# Patient Record
Sex: MALE | Race: WHITE | NOT HISPANIC OR LATINO | Employment: OTHER | URBAN - METROPOLITAN AREA
[De-identification: names, ages, dates, MRNs, and addresses within clinical notes are randomized per-mention and may not be internally consistent; named-entity substitution may affect disease eponyms.]

---

## 2019-01-26 ENCOUNTER — HOSPITAL ENCOUNTER (INPATIENT)
Facility: HOSPITAL | Age: 69
LOS: 3 days | Discharge: HOME | DRG: 813 | End: 2019-01-29
Attending: EMERGENCY MEDICINE | Admitting: INTERNAL MEDICINE
Payer: COMMERCIAL

## 2019-01-26 ENCOUNTER — APPOINTMENT (EMERGENCY)
Dept: CT IMAGING | Facility: HOSPITAL | Age: 69
DRG: 813 | End: 2019-01-26
Payer: COMMERCIAL

## 2019-01-26 ENCOUNTER — APPOINTMENT (EMERGENCY)
Dept: RADIOLOGY | Facility: HOSPITAL | Age: 69
DRG: 813 | End: 2019-01-26
Payer: COMMERCIAL

## 2019-01-26 DIAGNOSIS — Q98.4 KLINEFELTER SYNDROME: Chronic | ICD-10-CM

## 2019-01-26 DIAGNOSIS — Z86.69 HISTORY OF MIGRAINE HEADACHES: ICD-10-CM

## 2019-01-26 DIAGNOSIS — E03.9 HYPOTHYROIDISM: ICD-10-CM

## 2019-01-26 DIAGNOSIS — R07.9 CHEST PAIN: Primary | ICD-10-CM

## 2019-01-26 DIAGNOSIS — E34.9 TESTOSTERONE DEFICIENCY: ICD-10-CM

## 2019-01-26 DIAGNOSIS — D69.6 THROMBOCYTOPENIA (HCC): ICD-10-CM

## 2019-01-26 DIAGNOSIS — R73.9 HYPERGLYCEMIA: ICD-10-CM

## 2019-01-26 DIAGNOSIS — J06.9 URI (UPPER RESPIRATORY INFECTION): ICD-10-CM

## 2019-01-26 PROBLEM — D64.9 ANEMIA: Chronic | Status: ACTIVE | Noted: 2019-01-26

## 2019-01-26 PROBLEM — I10 HYPERTENSION: Chronic | Status: ACTIVE | Noted: 2019-01-26

## 2019-01-26 PROBLEM — E11.9 TYPE 2 DIABETES MELLITUS (HCC): Chronic | Status: ACTIVE | Noted: 2019-01-26

## 2019-01-26 PROBLEM — N17.9 ACUTE KIDNEY INJURY (HCC): Status: ACTIVE | Noted: 2019-01-26

## 2019-01-26 PROBLEM — E55.9 VITAMIN D DEFICIENCY: Chronic | Status: ACTIVE | Noted: 2019-01-26

## 2019-01-26 LAB
ABO GROUP BLD: NORMAL
ALBUMIN SERPL BCP-MCNC: 3.1 G/DL (ref 3.5–5)
ALP SERPL-CCNC: 126 U/L (ref 46–116)
ALT SERPL W P-5'-P-CCNC: 22 U/L (ref 12–78)
ANION GAP SERPL CALCULATED.3IONS-SCNC: 12 MMOL/L (ref 4–13)
APTT PPP: 28 SECONDS (ref 26–38)
AST SERPL W P-5'-P-CCNC: 21 U/L (ref 5–45)
BASOPHILS # BLD AUTO: 0.04 THOUSANDS/ΜL (ref 0–0.1)
BASOPHILS NFR BLD AUTO: 1 % (ref 0–1)
BILIRUB SERPL-MCNC: 0.5 MG/DL (ref 0.2–1)
BLD GP AB SCN SERPL QL: NEGATIVE
BUN SERPL-MCNC: 14 MG/DL (ref 5–25)
CALCIUM SERPL-MCNC: 9.1 MG/DL (ref 8.3–10.1)
CHLORIDE SERPL-SCNC: 103 MMOL/L (ref 100–108)
CO2 SERPL-SCNC: 23 MMOL/L (ref 21–32)
CREAT SERPL-MCNC: 1.32 MG/DL (ref 0.6–1.3)
EOSINOPHIL # BLD AUTO: 0.05 THOUSAND/ΜL (ref 0–0.61)
EOSINOPHIL NFR BLD AUTO: 1 % (ref 0–6)
ERYTHROCYTE [DISTWIDTH] IN BLOOD BY AUTOMATED COUNT: 16.4 % (ref 11.6–15.1)
GFR SERPL CREATININE-BSD FRML MDRD: 56 ML/MIN/1.73SQ M
GLUCOSE SERPL-MCNC: 120 MG/DL (ref 65–140)
GLUCOSE SERPL-MCNC: 131 MG/DL (ref 65–140)
GLUCOSE SERPL-MCNC: 176 MG/DL (ref 65–140)
HCT VFR BLD AUTO: 32.7 % (ref 36.5–49.3)
HGB BLD-MCNC: 10.3 G/DL (ref 12–17)
IMM GRANULOCYTES # BLD AUTO: 0.01 THOUSAND/UL (ref 0–0.2)
IMM GRANULOCYTES NFR BLD AUTO: 0 % (ref 0–2)
INR PPP: 0.97 (ref 0.86–1.17)
LYMPHOCYTES # BLD AUTO: 1.01 THOUSANDS/ΜL (ref 0.6–4.47)
LYMPHOCYTES NFR BLD AUTO: 22 % (ref 14–44)
MAGNESIUM SERPL-MCNC: 1.9 MG/DL (ref 1.6–2.6)
MCH RBC QN AUTO: 28 PG (ref 26.8–34.3)
MCHC RBC AUTO-ENTMCNC: 31.5 G/DL (ref 31.4–37.4)
MCV RBC AUTO: 89 FL (ref 82–98)
MONOCYTES # BLD AUTO: 0.33 THOUSAND/ΜL (ref 0.17–1.22)
MONOCYTES NFR BLD AUTO: 7 % (ref 4–12)
NEUTROPHILS # BLD AUTO: 3.13 THOUSANDS/ΜL (ref 1.85–7.62)
NEUTS SEG NFR BLD AUTO: 69 % (ref 43–75)
NRBC BLD AUTO-RTO: 0 /100 WBCS
PLATELET # BLD AUTO: 4 THOUSANDS/UL (ref 149–390)
POTASSIUM SERPL-SCNC: 3.6 MMOL/L (ref 3.5–5.3)
PROT SERPL-MCNC: 7.1 G/DL (ref 6.4–8.2)
PROTHROMBIN TIME: 12.6 SECONDS (ref 11.8–14.2)
RBC # BLD AUTO: 3.68 MILLION/UL (ref 3.88–5.62)
RH BLD: POSITIVE
SODIUM SERPL-SCNC: 138 MMOL/L (ref 136–145)
SPECIMEN EXPIRATION DATE: NORMAL
TROPONIN I SERPL-MCNC: <0.02 NG/ML
WBC # BLD AUTO: 4.57 THOUSAND/UL (ref 4.31–10.16)

## 2019-01-26 PROCEDURE — 86900 BLOOD TYPING SEROLOGIC ABO: CPT | Performed by: EMERGENCY MEDICINE

## 2019-01-26 PROCEDURE — 96361 HYDRATE IV INFUSION ADD-ON: CPT

## 2019-01-26 PROCEDURE — 70450 CT HEAD/BRAIN W/O DYE: CPT

## 2019-01-26 PROCEDURE — 86901 BLOOD TYPING SEROLOGIC RH(D): CPT | Performed by: EMERGENCY MEDICINE

## 2019-01-26 PROCEDURE — 85730 THROMBOPLASTIN TIME PARTIAL: CPT | Performed by: EMERGENCY MEDICINE

## 2019-01-26 PROCEDURE — 36415 COLL VENOUS BLD VENIPUNCTURE: CPT | Performed by: EMERGENCY MEDICINE

## 2019-01-26 PROCEDURE — 82948 REAGENT STRIP/BLOOD GLUCOSE: CPT

## 2019-01-26 PROCEDURE — 36430 TRANSFUSION BLD/BLD COMPNT: CPT

## 2019-01-26 PROCEDURE — 71046 X-RAY EXAM CHEST 2 VIEWS: CPT

## 2019-01-26 PROCEDURE — 96360 HYDRATION IV INFUSION INIT: CPT

## 2019-01-26 PROCEDURE — 84484 ASSAY OF TROPONIN QUANT: CPT | Performed by: EMERGENCY MEDICINE

## 2019-01-26 PROCEDURE — 99285 EMERGENCY DEPT VISIT HI MDM: CPT

## 2019-01-26 PROCEDURE — 93005 ELECTROCARDIOGRAM TRACING: CPT

## 2019-01-26 PROCEDURE — 86850 RBC ANTIBODY SCREEN: CPT | Performed by: EMERGENCY MEDICINE

## 2019-01-26 PROCEDURE — 85610 PROTHROMBIN TIME: CPT | Performed by: EMERGENCY MEDICINE

## 2019-01-26 PROCEDURE — 85025 COMPLETE CBC W/AUTO DIFF WBC: CPT | Performed by: EMERGENCY MEDICINE

## 2019-01-26 PROCEDURE — 99223 1ST HOSP IP/OBS HIGH 75: CPT | Performed by: INTERNAL MEDICINE

## 2019-01-26 PROCEDURE — P9037 PLATE PHERES LEUKOREDU IRRAD: HCPCS

## 2019-01-26 PROCEDURE — 84484 ASSAY OF TROPONIN QUANT: CPT | Performed by: INTERNAL MEDICINE

## 2019-01-26 PROCEDURE — 83735 ASSAY OF MAGNESIUM: CPT | Performed by: EMERGENCY MEDICINE

## 2019-01-26 PROCEDURE — 80053 COMPREHEN METABOLIC PANEL: CPT | Performed by: EMERGENCY MEDICINE

## 2019-01-26 PROCEDURE — 30233R1 TRANSFUSION OF NONAUTOLOGOUS PLATELETS INTO PERIPHERAL VEIN, PERCUTANEOUS APPROACH: ICD-10-PCS | Performed by: EMERGENCY MEDICINE

## 2019-01-26 RX ORDER — ESCITALOPRAM OXALATE 10 MG/1
10 TABLET ORAL DAILY
COMMUNITY
Start: 2017-08-09

## 2019-01-26 RX ORDER — CHLORTHALIDONE 25 MG/1
25 TABLET ORAL DAILY
Status: DISCONTINUED | OUTPATIENT
Start: 2019-01-26 | End: 2019-01-26

## 2019-01-26 RX ORDER — NITROGLYCERIN 0.4 MG/1
0.4 TABLET SUBLINGUAL
Status: DISCONTINUED | OUTPATIENT
Start: 2019-01-26 | End: 2019-01-29 | Stop reason: HOSPADM

## 2019-01-26 RX ORDER — RISPERIDONE 0.25 MG/1
0.5 TABLET, FILM COATED ORAL 2 TIMES DAILY
Status: DISCONTINUED | OUTPATIENT
Start: 2019-01-26 | End: 2019-01-29 | Stop reason: HOSPADM

## 2019-01-26 RX ORDER — ESCITALOPRAM OXALATE 10 MG/1
10 TABLET ORAL DAILY
Status: DISCONTINUED | OUTPATIENT
Start: 2019-01-26 | End: 2019-01-29 | Stop reason: HOSPADM

## 2019-01-26 RX ORDER — DIPHENHYDRAMINE HCL 25 MG
25 TABLET ORAL EVERY 6 HOURS PRN
Status: DISCONTINUED | OUTPATIENT
Start: 2019-01-26 | End: 2019-01-29 | Stop reason: HOSPADM

## 2019-01-26 RX ORDER — ONDANSETRON 2 MG/ML
4 INJECTION INTRAMUSCULAR; INTRAVENOUS EVERY 6 HOURS PRN
Status: DISCONTINUED | OUTPATIENT
Start: 2019-01-26 | End: 2019-01-29 | Stop reason: HOSPADM

## 2019-01-26 RX ORDER — SODIUM CHLORIDE 9 MG/ML
125 INJECTION, SOLUTION INTRAVENOUS CONTINUOUS
Status: DISCONTINUED | OUTPATIENT
Start: 2019-01-26 | End: 2019-01-26

## 2019-01-26 RX ORDER — PREDNISONE 20 MG/1
100 TABLET ORAL DAILY
Status: DISCONTINUED | OUTPATIENT
Start: 2019-01-26 | End: 2019-01-29 | Stop reason: HOSPADM

## 2019-01-26 RX ORDER — AZELASTINE 1 MG/ML
1 SPRAY, METERED NASAL 2 TIMES DAILY PRN
COMMUNITY

## 2019-01-26 RX ORDER — ERGOCALCIFEROL 1.25 MG/1
50000 CAPSULE ORAL WEEKLY
Status: DISCONTINUED | OUTPATIENT
Start: 2019-01-26 | End: 2019-01-29 | Stop reason: HOSPADM

## 2019-01-26 RX ORDER — LOSARTAN POTASSIUM 50 MG/1
100 TABLET ORAL DAILY
Status: DISCONTINUED | OUTPATIENT
Start: 2019-01-26 | End: 2019-01-29 | Stop reason: HOSPADM

## 2019-01-26 RX ORDER — CHLORTHALIDONE 25 MG/1
25 TABLET ORAL DAILY
COMMUNITY
Start: 2017-09-27 | End: 2019-01-29 | Stop reason: HOSPADM

## 2019-01-26 RX ORDER — SODIUM CHLORIDE 9 MG/ML
50 INJECTION, SOLUTION INTRAVENOUS CONTINUOUS
Status: DISCONTINUED | OUTPATIENT
Start: 2019-01-26 | End: 2019-01-27

## 2019-01-26 RX ORDER — FERROUS SULFATE 325(65) MG
325 TABLET ORAL DAILY
Status: DISCONTINUED | OUTPATIENT
Start: 2019-01-26 | End: 2019-01-29 | Stop reason: HOSPADM

## 2019-01-26 RX ORDER — CETIRIZINE HYDROCHLORIDE 10 MG/1
10 TABLET ORAL DAILY
COMMUNITY
Start: 2018-05-21

## 2019-01-26 RX ORDER — LEVOTHYROXINE SODIUM 0.07 MG/1
75 TABLET ORAL DAILY
Status: ON HOLD | COMMUNITY
Start: 2019-01-25 | End: 2019-01-29

## 2019-01-26 RX ORDER — ATORVASTATIN CALCIUM 40 MG/1
40 TABLET, FILM COATED ORAL DAILY
COMMUNITY
Start: 2018-08-06 | End: 2019-08-06

## 2019-01-26 RX ORDER — LOSARTAN POTASSIUM 100 MG/1
100 TABLET ORAL DAILY
COMMUNITY
Start: 2018-01-24

## 2019-01-26 RX ORDER — AZELASTINE 1 MG/ML
1 SPRAY, METERED NASAL 2 TIMES DAILY PRN
Status: DISCONTINUED | OUTPATIENT
Start: 2019-01-26 | End: 2019-01-28

## 2019-01-26 RX ORDER — LORATADINE 10 MG/1
10 TABLET ORAL DAILY
Status: DISCONTINUED | OUTPATIENT
Start: 2019-01-26 | End: 2019-01-29 | Stop reason: HOSPADM

## 2019-01-26 RX ORDER — FERROUS SULFATE 325(65) MG
325 TABLET ORAL DAILY
COMMUNITY
Start: 2018-05-21

## 2019-01-26 RX ORDER — SENNOSIDES 8.6 MG
325 CAPSULE ORAL EVERY 6 HOURS
COMMUNITY

## 2019-01-26 RX ORDER — ATORVASTATIN CALCIUM 40 MG/1
40 TABLET, FILM COATED ORAL DAILY
Status: DISCONTINUED | OUTPATIENT
Start: 2019-01-26 | End: 2019-01-29 | Stop reason: HOSPADM

## 2019-01-26 RX ORDER — SENNOSIDES 8.6 MG
1 TABLET ORAL
Status: DISCONTINUED | OUTPATIENT
Start: 2019-01-26 | End: 2019-01-28

## 2019-01-26 RX ORDER — CALCIUM CARBONATE 200(500)MG
1000 TABLET,CHEWABLE ORAL DAILY PRN
Status: DISCONTINUED | OUTPATIENT
Start: 2019-01-26 | End: 2019-01-29 | Stop reason: HOSPADM

## 2019-01-26 RX ORDER — ERGOCALCIFEROL (VITAMIN D2) 1250 MCG
50000 CAPSULE ORAL WEEKLY
COMMUNITY
Start: 2019-01-25 | End: 2020-01-25

## 2019-01-26 RX ORDER — PANTOPRAZOLE SODIUM 40 MG/1
40 TABLET, DELAYED RELEASE ORAL
Status: DISCONTINUED | OUTPATIENT
Start: 2019-01-27 | End: 2019-01-29 | Stop reason: HOSPADM

## 2019-01-26 RX ORDER — RISPERIDONE 0.5 MG/1
0.5 TABLET, FILM COATED ORAL 2 TIMES DAILY
COMMUNITY
Start: 2018-05-02

## 2019-01-26 RX ORDER — ACETAMINOPHEN 325 MG/1
650 TABLET ORAL EVERY 6 HOURS PRN
Status: DISCONTINUED | OUTPATIENT
Start: 2019-01-26 | End: 2019-01-29 | Stop reason: HOSPADM

## 2019-01-26 RX ORDER — DIPHENHYDRAMINE HCL 25 MG
25 TABLET ORAL EVERY 6 HOURS PRN
Status: DISCONTINUED | OUTPATIENT
Start: 2019-01-26 | End: 2019-01-26

## 2019-01-26 RX ORDER — LEVOTHYROXINE SODIUM 0.07 MG/1
75 TABLET ORAL DAILY
Status: DISCONTINUED | OUTPATIENT
Start: 2019-01-27 | End: 2019-01-29

## 2019-01-26 RX ADMIN — FERROUS SULFATE TAB 325 MG (65 MG ELEMENTAL FE) 325 MG: 325 (65 FE) TAB at 15:28

## 2019-01-26 RX ADMIN — INSULIN LISPRO 1 UNITS: 100 INJECTION, SOLUTION INTRAVENOUS; SUBCUTANEOUS at 21:54

## 2019-01-26 RX ADMIN — Medication 1 TABLET: at 15:28

## 2019-01-26 RX ADMIN — SODIUM CHLORIDE 75 ML/HR: 0.9 INJECTION, SOLUTION INTRAVENOUS at 15:16

## 2019-01-26 RX ADMIN — Medication 60 G: at 15:21

## 2019-01-26 RX ADMIN — ESCITALOPRAM OXALATE 10 MG: 10 TABLET ORAL at 15:27

## 2019-01-26 RX ADMIN — STANDARDIZED SENNA CONCENTRATE 8.6 MG: 8.6 TABLET ORAL at 21:54

## 2019-01-26 RX ADMIN — INSULIN DETEMIR 10 UNITS: 100 INJECTION, SOLUTION SUBCUTANEOUS at 21:54

## 2019-01-26 RX ADMIN — ATORVASTATIN CALCIUM 40 MG: 40 TABLET, FILM COATED ORAL at 15:28

## 2019-01-26 RX ADMIN — LORATADINE 10 MG: 10 TABLET ORAL at 15:28

## 2019-01-26 RX ADMIN — LOSARTAN POTASSIUM 100 MG: 50 TABLET, FILM COATED ORAL at 15:27

## 2019-01-26 RX ADMIN — Medication 60 G: at 19:37

## 2019-01-26 RX ADMIN — INSULIN LISPRO 2 UNITS: 100 INJECTION, SOLUTION INTRAVENOUS; SUBCUTANEOUS at 16:15

## 2019-01-26 RX ADMIN — NITROGLYCERIN 1 INCH: 20 OINTMENT TOPICAL at 11:17

## 2019-01-26 RX ADMIN — ACETAMINOPHEN 650 MG: 325 TABLET ORAL at 16:14

## 2019-01-26 RX ADMIN — DIPHENHYDRAMINE HCL 25 MG: 25 TABLET ORAL at 21:54

## 2019-01-26 RX ADMIN — RISPERIDONE 0.5 MG: 0.25 TABLET ORAL at 16:14

## 2019-01-26 RX ADMIN — Medication 400 MG: at 15:28

## 2019-01-26 RX ADMIN — CALCIUM CARBONATE (ANTACID) CHEW TAB 500 MG 1000 MG: 500 CHEW TAB at 21:54

## 2019-01-26 RX ADMIN — SODIUM CHLORIDE 125 ML/HR: 0.9 INJECTION, SOLUTION INTRAVENOUS at 11:16

## 2019-01-26 RX ADMIN — PREDNISONE 100 MG: 20 TABLET ORAL at 13:14

## 2019-01-26 NOTE — ASSESSMENT & PLAN NOTE
No results found for: HGBA1C    No results for input(s): POCGLU in the last 72 hours  Blood Sugar Average: Last 72 hrs:    · Insulin sliding scale  · Basal bolus insulin  · Monitor blood sugars  · Check A1c level  · Adjust treatment accordingly  · Hold off oral hypoglycemic agents while patient is in the hospital (patient is on metformin)  · I expect patient's blood sugars to go up as patient will be on prednisone

## 2019-01-26 NOTE — ED NOTES
Spoke with pharmacy regarding prednisone, stated that they will not verify order until more information regarding dose/reason is obtained by doctor       Galina Swanson, TRINA  01/26/19 8517

## 2019-01-26 NOTE — ASSESSMENT & PLAN NOTE
· Due to this, patient has testosterone deficiency  · At [facility], at [facility], patient's testosterone, done a few days ago, was very low  · Presently, with patient being evaluated for possibility of acute coronary syndrome, and testosterone known to increase the risk for MI, we will hold off any test also injection at this point, until totally rule out of any acute coronary syndrome  Presently, 1st troponin is negative  From my discussion with him, if totally rule out from any myocardial infarction, we will start the testosterone injection tomorrow  · Patient requests to see an endocrinologist, as he will stay here in [facility], for longer duration prior to going back to [place]  [facility]

## 2019-01-26 NOTE — ASSESSMENT & PLAN NOTE
· According the patient, he was just diagnosed again with vitamin-D deficiency  He has this before  · Thus according the patient, he was just prescribed with vitamin D 50,000 per week  Thus will start this medication today  · Outpatient follow-up with primary care physician

## 2019-01-26 NOTE — H&P
H&P- Jane Springer 1950, 79 y o  male MRN: [id number]    Unit/Bed#: ED 14 Encounter: [id number]    Primary Care Provider: No primary care provider on file  Date and time admitted to hospital: 1/26/2019 10:27 AM        * Thrombocytopenia ([address] )   Assessment & Plan    · Patient has chronic ITP  · Presently, patient's platelets are 9239  · Had some minor bruising on his left hand, dorsal side, with minor bleeding that has stopped  · Hematology consult  · Case discussed with Dr Luis Ruiz, hematologist, and from our discussion, we will start the patient on prednisone 100 mg once a day; we will give IV IG 1 gram/kilogram daily for 2 days (this was also discussed with the pharmacist)  With some bleeding, although minor, at this point in time, he is okay with the platelet transfusion that was ordered in the emergency room, for now, however, he told me that with ITP, this will just likely be destroyed  He also told me that it is not common for ITP to bleed  · With lightheadedness and headache, emergency room physician ordered for a stat CT scan of the head to rule out any bleed  Will follow up the results  · Monitor  · Watch out for any signs and symptoms of bleeding  Klinefelter syndrome   Assessment & Plan    · Due to this, patient has testosterone deficiency  · At [facility], at [facility], patient's testosterone, done a few days ago, was very low  · Presently, with patient being evaluated for possibility of acute coronary syndrome, and testosterone known to increase the risk for MI, we will hold off any test also injection at this point, until totally rule out of any acute coronary syndrome  Presently, 1st troponin is negative  From my discussion with him, if totally rule out from any myocardial infarction, we will start the testosterone injection tomorrow    · Patient requests to see an endocrinologist, as he will stay here in [facility], for longer duration prior to going back to [place]  Acute kidney injury [facility])   Assessment & Plan    · Based on my review of patient's previous BMP in [place], patient's baseline serum creatinine is around 0 84 to 1 0   · Likely prerenal   · Monitor  · IV fluids  · Avoid nephrotoxins  · Avoid hypotension  · Hold off chlorthalidone in the meantime  · If patient's kidney function is worsening, consider holding off losartan also  · For further workup and management if this worsens significantly or if no improvement  Vitamin D deficiency   Assessment & Plan    · According the patient, he was just diagnosed again with vitamin-D deficiency  He has this before  · Thus according the patient, he was just prescribed with vitamin D 50,000 per week  Thus will start this medication today  · Outpatient follow-up with primary care physician  Hypertension   Assessment & Plan    · Continue blood pressure medications  [facility] mellitus [facility]   Assessment & Plan    No results found for: HGBA1C    No results for input(s): POCGLU in the last 72 hours  Blood Sugar Average: Last 72 hrs:    · Insulin sliding scale  · Basal bolus insulin  · Monitor blood sugars  · Check A1c level  · Adjust treatment accordingly  · Hold off oral hypoglycemic agents while patient is in the hospital (patient is on metformin)  · I expect patient's blood sugars to go up as patient will be on prednisone  Anemia   Assessment & Plan    · Chronic  · Mild  · Monitor  · For further workup management if this worsens significantly  Chest pain   Assessment & Plan    · Rule out acute coronary syndrome  · No aspirin as patient has severe thrombocytopenia  · Trend patient's troponins  · Nitroglycerin p r n     · Oxygen p r n  Shailesh Pearce · Hold off testosterone injection in the meantime until completely rule out of possibility of acute coronary syndrome             VTE Prophylaxis: Pharmacologic VTE Prophylaxis contraindicated due to Severe thrombocytopenia  / sequential compression device   Code Status:  Full code  POLST: POLST form is not discussed and not completed at this time  Anticipated Length of Stay:  Patient will be admitted on an Inpatient basis with an anticipated length of stay of  greater than 2 midnights  Justification for Hospital Stay:  Due to the above findings and plans  Total Time for Visit, including Counseling / Coordination of Care: 1 hour  Greater than 50% of this total time spent on direct patient counseling and coordination of care  Chief Complaint:   Chest pain  History of Present Illness:    Branden Antonio is a 79 y o  male who presents to the emergency room [facility] with complaints of chest pains  Patient has history of Klinefelter syndrome with testosterone deficiency and history of chronic ITP  Patient also has history of hypertension and diabetes mellitus  Patient is from [place], but is visiting here in [facility] as he attended the  of her aunt  He told me that he had an episode of chest pain, when he woke up this morning  He described it as if an elephant was sitting on his chest   A few days ago, he received anemia from his doctor at [facility] and he was told that his platelet count is low at 8, as well as his testosterone level and vitamin-D level  He was instructed to checked up by both a hematologist and an endocrinologist   Patient was also prescribed with vitamin-D  Patient also has occasional lightheadedness and headache  Patient had some minor bruising on the left hand, that had a mild bleeding that has stopped  Otherwise no other bleeding  Patient denies any blood in the stools or blood in the urine or any bleeding in other parts of the body  Patient feels generally weak and admits to occasional shortness of breath  Patient denies any other symptoms other the ones mentioned above      Review of Systems:    Review of Systems     Ten point review systems done and they were negative except for the ones I mentioned in my history of present illness  Patient denies any nausea, vomiting or any abdominal pains  Patient denies any problems urinating or moving his bowels  Patient denies any fever chills or any cough or colds  Past Medical and Surgical History:     Past Medical History:   Diagnosis Date    Diabetes mellitus ([address] )     Hypertension     Klinefelter syndrome with XY/XXY mosaicism        Past Surgical History:   Procedure Laterality Date    CHOLECYSTECTOMY      GASTRIC BYPASS      REDUCTION MAMMAPLASTY      THROMBECTOMY      TONSILECTOMY AND ADNOIDECTOMY         Meds/Allergies:    Prior to Admission medications    Medication Sig Start Date End Date Taking?  Authorizing Provider   acetaminophen (TYLENOL) 650 mg CR tablet Take 325 mg by mouth every 6 (six) hours   Yes Historical Provider, MD   atorvastatin (LIPITOR) 40 mg tablet Take 40 mg by mouth daily 8/6/18 8/6/19 Yes Historical Provider, MD   azelastine (ASTELIN) 0 1 % nasal spray 1 spray into each nostril 2 (two) times a day as needed   Yes Historical Provider, MD   cetirizine (ZyrTEC) 10 mg tablet Take 10 mg by mouth daily 5/21/18  Yes Historical Provider, MD   chlorthalidone 25 mg tablet Take 25 mg by mouth daily 9/27/17  Yes Historical Provider, MD   Cyanocobalamin 1000 MCG/ML KIT Inject 1,000 mcg into a muscle every 30 (thirty) days 1/23/19  Yes Historical Provider, MD   ergocalciferol (ERGOCALCIFEROL) 06336 units capsule Take 50,000 Units by mouth once a week 1/25/19 1/25/20 Yes Historical Provider, MD   ferrous sulfate 325 (65 Fe) mg tablet Take 325 mg by mouth daily 5/21/18  Yes Historical Provider, MD   levothyroxine 75 mcg tablet Take 75 mcg by mouth daily 1/25/19 1/25/20 Yes Historical Provider, MD   losartan (COZAAR) 100 MG tablet Take 100 mg by mouth daily 1/24/18  Yes Historical Provider, MD   metFORMIN (GLUCOPHAGE) 500 mg tablet Take 1,000 mg by mouth daily 1/24/18  Yes Historical Provider, MD risperiDONE (RisperDAL) 0 5 mg tablet Take 0 5 mg by mouth 2 (two) times a day 5/2/18  Yes Historical Provider, MD   TESTOSTERONE CYPIONATE IM Inject 300 mg into a muscle every 14 (fourteen) days 1/23/19  Yes Historical Provider, MD   escitalopram (LEXAPRO) 10 mg tablet Take 10 mg by mouth daily 8/9/17   Historical Provider, MD   magnesium oxide (MAGOX 400) 400 mg Take 400 mg by mouth daily    Historical Provider, MD   Multiple Vitamins-Minerals (MULTIVITAMIN ADULT PO) Take 1 tablet by mouth daily    Historical Provider, MD     Patient's medication list was reviewed  Allergies: Allergies   Allergen Reactions    Lactose     Vicodin [Hydrocodone-Acetaminophen] Hives       Social History:     Marital Status:      History   Alcohol Use    Yes     History   Smoking Status    Never Smoker   Smokeless Tobacco    Not on file     History   Drug Use No       Family History:    History reviewed  No pertinent family history  Physical Exam:     Vitals:   Blood Pressure: 144/71 (01/26/19 1316)  Pulse: 61 (01/26/19 1316)  Temperature: 98 1 °F (36 7 °C) (01/26/19 1316)  Temp Source: Oral (01/26/19 1316)  Respirations: 17 (01/26/19 1316)  Weight - Scale: 126 kg (277 lb 12 5 oz) (01/26/19 1047)  SpO2: 97 % (01/26/19 1316)    Physical Exam   Constitutional: He is oriented to person, place, and time  No distress  HENT:   Head: Normocephalic and atraumatic  Nose: Nose normal    Mouth/Throat: No oropharyngeal exudate  Dry oral mucosa, dry tongue  Eyes: Conjunctivae are normal  Right eye exhibits no discharge  Left eye exhibits no discharge  No scleral icterus  Neck: No JVD present  No tracheal deviation present  Cardiovascular: Normal rate, regular rhythm and normal heart sounds  Exam reveals no gallop and no friction rub  No murmur heard  Pulmonary/Chest: Effort normal and breath sounds normal  No stridor  No respiratory distress  He has no wheezes  He has no rales  Abdominal: Soft   Bowel sounds are normal  He exhibits no distension  There is no tenderness  There is no rebound and no guarding    (patient mentioned mild soreness when I palpated his belly, but no significant tenderness)  Musculoskeletal: He exhibits no edema, tenderness or deformity  Neurological: He is alert and oriented to person, place, and time  No cranial nerve deficit  Skin: Skin is warm  No rash noted  He is not diaphoretic  No erythema  No pallor  Psychiatric: He has a normal mood and affect  His behavior is normal  Thought content normal    Vitals reviewed  Additional Data:     Lab Results: I have personally reviewed pertinent reports  Results from last 7 days  Lab Units 01/26/19  1109   WBC Thousand/uL 4 57   HEMOGLOBIN g/dL 10 3*   HEMATOCRIT % 32 7*   PLATELETS Thousands/uL 4*   NEUTROS PCT % 69   LYMPHS PCT % 22   MONOS PCT % 7   EOS PCT % 1       Results from last 7 days  Lab Units 01/26/19  1109   SODIUM mmol/L 138   POTASSIUM mmol/L 3 6   CHLORIDE mmol/L 103   CO2 mmol/L 23   BUN mg/dL 14   CREATININE mg/dL 1 32*   ANION GAP mmol/L 12   CALCIUM mg/dL 9 1   ALBUMIN g/dL 3 1*   TOTAL BILIRUBIN mg/dL 0 50   ALK PHOS U/L 126*   ALT U/L 22   AST U/L 21   GLUCOSE RANDOM mg/dL 120       Results from last 7 days  Lab Units 01/26/19  1109   INR  0 97                   Imaging: I have personally reviewed pertinent reports  CT head without contrast   Final Result by Gardenia Sheehan MD (01/26 1238)      No acute intracranial abnormality  Workstation performed: [id number]         XR chest 2 views    (Results Pending)       EKG, Pathology, and Other Studies Reviewed on Admission:   · EKG:  Sinus bradycardia at 55 per minute, with minimal voltage criteria for LVH, maybe normal variant  No previous EKG for comparison  [facility] Yes     ** Please Note: This note has been constructed using a voice recognition system   **

## 2019-01-26 NOTE — ASSESSMENT & PLAN NOTE
· Based on my review of patient's previous BMP in [place], patient's baseline serum creatinine is around 0 84 to 1 0   · Likely prerenal   · Monitor  · IV fluids  · Avoid nephrotoxins  · Avoid hypotension  · Hold off chlorthalidone in the meantime  · If patient's kidney function is worsening, consider holding off losartan also  · For further workup and management if this worsens significantly or if no improvement

## 2019-01-26 NOTE — ASSESSMENT & PLAN NOTE
· Rule out acute coronary syndrome  · No aspirin as patient has severe thrombocytopenia  · Trend patient's troponins  · Nitroglycerin p r n     · Oxygen p r n  Suleiman Ingles · Hold off testosterone injection in the meantime until completely rule out of possibility of acute coronary syndrome

## 2019-01-26 NOTE — PLAN OF CARE
CARDIOVASCULAR - ADULT     Maintains optimal cardiac output and hemodynamic stability Progressing     Absence of cardiac dysrhythmias or at baseline rhythm Progressing        METABOLIC, FLUID AND ELECTROLYTES - ADULT     Electrolytes maintained within normal limits Progressing     Fluid balance maintained Progressing     Glucose maintained within target range Progressing        Potential for Falls     Patient will remain free of falls Progressing

## 2019-01-26 NOTE — PROGRESS NOTES
Pt complained about chest pain at 1530  Performed EKG and vitals sign  Trop are negative so far  Pt requested tylenol and risperdal medication  Gave to the pt  Called [facility]  No new orders given at this moment  Will continue to monitor pt

## 2019-01-26 NOTE — ED PROVIDER NOTES
History  Chief Complaint   Patient presents with    Chest Pain     pt reports CP, SOB, weakness for appox "24-48hours" Pt resides in [place] here visiting family  Pt recieved email from hematologist stating "my platelets are 8, she told me to get to a hospital right away" Pt reprots pain feels like "an elephant on my chest"  Pt further reprots recent passing of female figure and dog     16-year-old male presents to the emergency department for evaluation of heavy pressure like chest pain that has been present for the past 1 to 2 days  Patient states that the pain that started while he was at rest and has been fluctuating in intensity  Patient states he started to have acid reflux symptoms 2 days ago  He denies having a history of coronary artery disease  He recently traveled to the area from [place]  He is currently staying with his mother  He recently lost his dog and his wife  1 year ago  He plans to stay here he and would need to establish care with a hematologist   Patient has a history of thrombocytopenia, he was contacted by his hematologist on the way to [place] and was told that his platelets were 3619 and that he needed to be seen by hematologist as soon as possible  Patient does admit to occasional bruising  He has not had any spontaneous bleeding  He has been having occasional migraines last headache was 2 days ago  He denies recent fall or head injury  Patient states that his chest pain is associated with shortness of breath at times  He is currently without any respiratory symptoms          History provided by:  Patient and medical records   used: No    Chest Pain   Pain location:  Substernal area  Pain quality: pressure    Pain radiates to:  Does not radiate  Pain radiates to the back: no    Pain severity:  Severe  Onset quality:  Unable to specify  Duration:  2 days  Timing:  Intermittent  Progression:  Waxing and waning  Chronicity:  New  Context: at rest Relieved by:  Nothing  Worsened by:  Nothing tried  Ineffective treatments:  None tried  Associated symptoms: fatigue, headache, heartburn, lower extremity edema and shortness of breath    Associated symptoms: no abdominal pain, no anorexia, no anxiety, no back pain, no cough, no diaphoresis, no nausea, no palpitations, no PND, no syncope, not vomiting and no weakness    Risk factors: diabetes mellitus, hypertension and male sex        Prior to Admission Medications   Prescriptions Last Dose Informant Patient Reported? Taking?    Cyanocobalamin 1000 MCG/ML KIT   Yes Yes   Sig: Inject 1,000 mcg into a muscle every 30 (thirty) days   Multiple Vitamins-Minerals (MULTIVITAMIN ADULT PO)   Yes No   Sig: Take 1 tablet by mouth daily   TESTOSTERONE CYPIONATE IM   Yes Yes   Sig: Inject 300 mg into a muscle every 14 (fourteen) days   acetaminophen (TYLENOL) 650 mg CR tablet   Yes Yes   Sig: Take 325 mg by mouth every 6 (six) hours   atorvastatin (LIPITOR) 40 mg tablet   Yes Yes   Sig: Take 40 mg by mouth daily   azelastine (ASTELIN) 0 1 % nasal spray   Yes Yes   Si spray into each nostril 2 (two) times a day as needed   cetirizine (ZyrTEC) 10 mg tablet   Yes Yes   Sig: Take 10 mg by mouth daily   chlorthalidone 25 mg tablet   Yes Yes   Sig: Take 25 mg by mouth daily   ergocalciferol (ERGOCALCIFEROL) 52095 units capsule   Yes Yes   Sig: Take 50,000 Units by mouth once a week   escitalopram (LEXAPRO) 10 mg tablet Not Taking at Unknown time  Yes No   Sig: Take 10 mg by mouth daily   ferrous sulfate 325 (65 Fe) mg tablet   Yes Yes   Sig: Take 325 mg by mouth daily   levothyroxine 75 mcg tablet   Yes Yes   Sig: Take 75 mcg by mouth daily   losartan (COZAAR) 100 MG tablet   Yes Yes   Sig: Take 100 mg by mouth daily   magnesium oxide (MAGOX 400) 400 mg   Yes No   Sig: Take 400 mg by mouth daily   metFORMIN (GLUCOPHAGE) 500 mg tablet   Yes Yes   Sig: Take 1,000 mg by mouth daily   risperiDONE (RisperDAL) 0 5 mg tablet   Yes Yes Sig: Take 0 5 mg by mouth 2 (two) times a day      Facility-Administered Medications: None       Past Medical History:   Diagnosis Date    Diabetes mellitus ([address] )     Hypertension     Klinefelter syndrome with XY/XXY mosaicism        Past Surgical History:   Procedure Laterality Date    CHOLECYSTECTOMY      GASTRIC BYPASS      REDUCTION MAMMAPLASTY      THROMBECTOMY      TONSILECTOMY AND ADNOIDECTOMY         History reviewed  No pertinent family history  I have reviewed and agree with the history as documented  Social History   Substance Use Topics    Smoking status: Never Smoker    Smokeless tobacco: Never Used    Alcohol use Yes        Review of Systems   Constitutional: Positive for fatigue  Negative for diaphoresis and unexpected weight change  Respiratory: Positive for shortness of breath  Negative for cough  Cardiovascular: Positive for chest pain  Negative for palpitations, syncope and PND  Gastrointestinal: Positive for heartburn  Negative for abdominal pain, anorexia, nausea and vomiting  Musculoskeletal: Negative for back pain and neck pain  Skin: Negative for wound  Neurological: Positive for headaches  Negative for weakness  Hematological: Bruises/bleeds easily  All other systems reviewed and are negative  Physical Exam  Physical Exam   Constitutional: He is oriented to person, place, and time  Vital signs are normal  He appears well-developed and well-nourished  HENT:   Head: Normocephalic and atraumatic  Right Ear: External ear normal    Left Ear: External ear normal    Nose: Nose normal    Mouth/Throat: Oropharynx is clear and moist    Eyes: Pupils are equal, round, and reactive to light  Conjunctivae and EOM are normal    Neck: Normal range of motion  Neck supple  Cardiovascular: Normal rate, regular rhythm, normal heart sounds and intact distal pulses      No reproducible chest wall tenderness   Pulmonary/Chest: Effort normal and breath sounds normal  No accessory muscle usage  No respiratory distress  He exhibits no tenderness  Abdominal: Soft  Normal appearance and bowel sounds are normal  He exhibits no distension  There is no tenderness  There is no rebound and no guarding  Musculoskeletal: Normal range of motion  He exhibits no edema, tenderness or deformity  Lymphadenopathy:     He has no cervical adenopathy  Neurological: He is alert and oriented to person, place, and time  He has normal strength and normal reflexes  Coordination normal    Skin: Skin is warm, dry and intact  Capillary refill takes less than 2 seconds  No rash noted  No pallor  Psychiatric: He has a normal mood and affect  His behavior is normal  Judgment and thought content normal    Nursing note and vitals reviewed        Vital Signs  ED Triage Vitals [01/26/19 1034]   Temperature Pulse Respirations Blood Pressure SpO2   98 5 °F (36 9 °C) 65 20 145/76 100 %      Temp Source Heart Rate Source Patient Position - Orthostatic VS BP Location FiO2 (%)   Oral Monitor Lying Right arm --      Pain Score       Worst Possible Pain           Vitals:    01/29/19 1010 01/29/19 1020 01/29/19 1021 01/29/19 1025   BP: 129/61 113/69 110/71 115/72   Pulse:       Patient Position - Orthostatic VS: Lying - Orthostatic VS Sitting - Orthostatic VS Sitting - Orthostatic VS Standing for 3 minutes - Orthostatic VS       Visual Acuity  Visual Acuity      Most Recent Value   R Pupil Size (mm)  3          ED Medications  Medications   nitroglycerin (NITRO-BID) 2 % TD ointment 1 inch (1 inch Topical Given 1/26/19 1117)   immune globulin, human (GAMUNEX-C) 60 g 600 mL IV soln (0 g Intravenous Stopped 1/27/19 2051)     And   immune globulin, human (GAMUNEX-C) 60 g 600 mL IV soln (0 g Intravenous Stopped 1/27/19 2336)   bisacodyl (DULCOLAX) rectal suppository 10 mg (10 mg Rectal Given 1/29/19 1322)       Diagnostic Studies  Results Reviewed     Procedure Component Value Units Date/Time    Hemoglobin A1c w/EAG Estimation (Orders if not completed within the last 90 days) [[id number]] Collected:  01/27/19 0431    Lab Status:  Final result Specimen:  Blood from Arm, Left Updated:  01/27/19 1258     Hemoglobin A1C 5 9 %       mg/dl     Basic metabolic panel [[id number]]  (Abnormal) Collected:  01/27/19 0431    Lab Status:  Final result Specimen:  Blood from Hand, Left Updated:  01/27/19 0704     Sodium 136 mmol/L      Potassium 4 2 mmol/L      Chloride 105 mmol/L      CO2 20 (L) mmol/L      ANION GAP 11 mmol/L      BUN 15 mg/dL      Creatinine 1 12 mg/dL      Glucose 133 mg/dL      Calcium 9 0 mg/dL      eGFR 68 ml/min/1 73sq m     Narrative:         National Kidney Disease Education Program recommendations are as follows:  GFR calculation is accurate only with a steady state creatinine  Chronic Kidney disease less than 60 ml/min/1 73 sq  meters  Kidney failure less than 15 ml/min/1 73 sq  meters      CBC (With Platelets) [[id number]]  (Abnormal) Collected:  01/27/19 0431    Lab Status:  Final result Specimen:  Blood from Arm, Left Updated:  01/27/19 0702     WBC 3 30 (L) Thousand/uL      RBC 3 47 (L) Million/uL      Hemoglobin 9 6 (L) g/dL      Hematocrit 31 7 (L) %      MCV 91 fL      MCH 27 7 pg      MCHC 30 3 (L) g/dL      RDW 16 6 (H) %      Platelets 29 (LL) Thousands/uL      MPV 12 0 fL     Troponin I [[id number]]  (Normal) Collected:  01/26/19 1500    Lab Status:  Final result Specimen:  Blood from Arm, Left Updated:  01/26/19 1542     Troponin I <0 02 ng/mL     CBC and differential [[id number]]  (Abnormal) Collected:  01/26/19 1109    Lab Status:  Final result Specimen:  Blood from Arm, Right Updated:  01/26/19 1155     WBC 4 57 Thousand/uL      RBC 3 68 (L) Million/uL      Hemoglobin 10 3 (L) g/dL      Hematocrit 32 7 (L) %      MCV 89 fL      MCH 28 0 pg      MCHC 31 5 g/dL      RDW 16 4 (H) %      Platelets 4 (LL) Thousands/uL      nRBC 0 /100 WBCs      Neutrophils Relative 69 %      Immat GRANS % 0 % Lymphocytes Relative 22 %      Monocytes Relative 7 %      Eosinophils Relative 1 %      Basophils Relative 1 %      Neutrophils Absolute 3 13 Thousands/µL      Immature Grans Absolute 0 01 Thousand/uL      Lymphocytes Absolute 1 01 Thousands/µL      Monocytes Absolute 0 33 Thousand/µL      Eosinophils Absolute 0 05 Thousand/µL      Basophils Absolute 0 04 Thousands/µL     Comprehensive metabolic panel [[id number]]  (Abnormal) Collected:  01/26/19 1109    Lab Status:  Final result Specimen:  Blood from Arm, Right Updated:  01/26/19 1141     Sodium 138 mmol/L      Potassium 3 6 mmol/L      Chloride 103 mmol/L      CO2 23 mmol/L      ANION GAP 12 mmol/L      BUN 14 mg/dL      Creatinine 1 32 (H) mg/dL      Glucose 120 mg/dL      Calcium 9 1 mg/dL      AST 21 U/L      ALT 22 U/L      Alkaline Phosphatase 126 (H) U/L      Total Protein 7 1 g/dL      Albumin 3 1 (L) g/dL      Total Bilirubin 0 50 mg/dL      eGFR 56 ml/min/1 73sq m     Narrative:         National Kidney Disease Education Program recommendations are as follows:  GFR calculation is accurate only with a steady state creatinine  Chronic Kidney disease less than 60 ml/min/1 73 sq  meters  Kidney failure less than 15 ml/min/1 73 sq  meters      Magnesium [[id number]]  (Normal) Collected:  01/26/19 1109    Lab Status:  Final result Specimen:  Blood from Arm, Right Updated:  01/26/19 1141     Magnesium 1 9 mg/dL     Troponin I [[id number]]  (Normal) Collected:  01/26/19 1109    Lab Status:  Final result Specimen:  Blood from Arm, Right Updated:  01/26/19 1138     Troponin I <0 02 ng/mL     Protime-INR [[id number]]  (Normal) Collected:  01/26/19 1109    Lab Status:  Final result Specimen:  Blood from Arm, Right Updated:  01/26/19 1128     Protime 12 6 seconds      INR 0 97    APTT [[id number]]  (Normal) Collected:  01/26/19 1109    Lab Status:  Final result Specimen:  Blood from Arm, Right Updated:  01/26/19 1128     PTT 28 seconds                  XR chest 2 views Final Result by Sierra Morillo MD (01/27 2285)      No acute cardiopulmonary disease  Workstation performed: [id number]         CT head without contrast   Final Result by Zoey Bland MD (01/26 1238)      No acute intracranial abnormality                    Workstation performed: [id number]                    Procedures  ECG 12 Lead Documentation  Date/Time: 1/26/2019 12:24 PM  Performed by: Agustin Mahmood  Authorized by: LUCI LYNCH     Indications / Diagnosis:  Chest pain  ECG reviewed by me, the ED Provider: yes    Patient location:  ED  Previous ECG:     Previous ECG:  Unavailable  Interpretation:     Interpretation: non-specific    Rate:     ECG rate:  55    ECG rate assessment: bradycardic    Rhythm:     Rhythm: sinus bradycardia    Ectopy:     Ectopy: none    QRS:     QRS axis:  Normal  Conduction:     Conduction: normal    Other findings:     Other findings: LVH             Phone Contacts  ED Phone Contact    ED Course         HEART Risk Score      Most Recent Value   History  1 Filed at: 01/26/2019 1223   ECG  2 Filed at: 01/26/2019 1223   Age  2 Filed at: 01/26/2019 1223   Risk Factors  2 Filed at: 01/26/2019 1223   Troponin  0 Filed at: 01/26/2019 1223   Heart Score Risk Calculator   History  1 Filed at: 01/26/2019 1223   ECG  2 Filed at: 01/26/2019 1223   Age  2 Filed at: 01/26/2019 1223   Risk Factors  2 Filed at: 01/26/2019 1223   Troponin  0 Filed at: 01/26/2019 1223   HEART Score  7 Filed at: 01/26/2019 1223   HEART Score  7 Filed at: 01/26/2019 1223                            MDM  Number of Diagnoses or Management Options  Chest pain: new and requires workup  History of migraine headaches: new and requires workup  Thrombocytopenia ([facility] ): new and requires workup     Amount and/or Complexity of Data Reviewed  Clinical lab tests: reviewed and ordered  Tests in the radiology section of CPT®: reviewed and ordered  Tests in the medicine section of CPT®: ordered and reviewed  Decide to obtain previous medical records or to obtain history from someone other than the patient: yes  Discuss the patient with other providers: yes  Independent visualization of images, tracings, or specimens: yes    Patient Progress  Patient progress: stable    CritCare Time    Disposition  Final diagnoses:   Chest pain   Thrombocytopenia ([facility] )   History of migraine headaches     Time reflects when diagnosis was documented in both MDM as applicable and the Disposition within this note     Time User Action Codes Description Comment    1/26/2019 12:09 PM White, Greta Add [R07 9] Chest pain     1/26/2019 12:10 PM White, Greta Add [D69 6] Thrombocytopenia ([facility] [address] )     1/26/2019 12:10 PM White, Greta Add [Z86 69] History of migraine headaches     1/26/2019 12:49 PM Shaheed, Alpiniano B Add [Q98 4] Klinefelter syndrome     1/26/2019 12:49 PM Shaheed, Alpiniano B Modify [Q98 4] Klinefelter syndrome     1/26/2019 12:49 PM Shaheed, Alpiniano B Modify [Q98 4] Klinefelter syndrome     1/26/2019 12:49 PM Shaheed, Alpiniano B Add [E34 9] Testosterone deficiency     1/29/2019  1:12 PM YurikEmileeirustyn Add [E03 9] Hypothyroidism     1/29/2019  1:12 PM Yurik Cuiyin Add [J06 9] URI (upper respiratory infection)     1/29/2019  2:35 PM [facility] [place] Add [R73 9] Hyperglycemia       ED Disposition     ED Disposition Condition Date/Time Comment    Admit  Sat Jan 26, 2019  1:05 PM Case was discussed with Dr Aubree Gama and the patient's admission status was agreed to be Admission Status: inpatient status to the service of Dr Aubree Gama   Follow-up Information     Follow up With Specialties Details Why Iker Reyes MD Endocrinology Follow up Call to schedule follow up with endocrinology   [facility] [address] [facility]   [phone]      [employer]  Follow up Call to assist with finding new PCP in [address] [place] [address]            Discharge Medication List as of 1/29/2019  3:37 PM      START taking these medications    Details   famotidine (PEPCID) 20 mg tablet Take 1 tablet (20 mg total) by mouth 2 (two) times a day, Starting Tue 1/29/2019, Print      guaiFENesin (MUCINEX) 600 mg 12 hr tablet Take 1 tablet (600 mg total) by mouth every 12 (twelve) hours for 14 doses, Starting Tue 1/29/2019, Until Tue 2/5/2019, Print      predniSONE 20 mg tablet 5 tabs daily x 3 days, then 4 5 tabs daily  x 1 wk,then 4 tabs daily x 1 wk, then 3 5 tabs daily x 1 wk, then 3 tabs daily x 1 wk, then 2 5 tabs daily x 1 wk, then 2 tabs daily x 1 wk, then 0 5 tabs daily x 1 w, Print         CONTINUE these medications which have CHANGED    Details   !! levothyroxine 88 mcg tablet Take 1 tablet (88 mcg total) by mouth daily, Starting Tue 1/29/2019, Until Wed 1/29/2020, Print      !! levothyroxine 88 mcg tablet Take 1 tablet (88 mcg total) by mouth daily, Starting Wed 1/30/2019, Print       !! - Potential duplicate medications found  Please discuss with provider        CONTINUE these medications which have NOT CHANGED    Details   acetaminophen (TYLENOL) 650 mg CR tablet Take 325 mg by mouth every 6 (six) hours, Historical Med      atorvastatin (LIPITOR) 40 mg tablet Take 40 mg by mouth daily, Starting Mon 8/6/2018, Until Tue 8/6/2019, Historical Med      azelastine (ASTELIN) 0 1 % nasal spray 1 spray into each nostril 2 (two) times a day as needed, Historical Med      cetirizine (ZyrTEC) 10 mg tablet Take 10 mg by mouth daily, Starting Mon 5/21/2018, Historical Med      Cyanocobalamin 1000 MCG/ML KIT Inject 1,000 mcg into a muscle every 30 (thirty) days, Starting Wed 1/23/2019, Historical Med      ergocalciferol (ERGOCALCIFEROL) 45998 units capsule Take 50,000 Units by mouth once a week, Starting Fri 1/25/2019, Until Sat 1/25/2020, Historical Med      ferrous sulfate 325 (65 Fe) mg tablet Take 325 mg by mouth daily, Starting Mon 5/21/2018, Historical Med      losartan (COZAAR) 100 MG tablet Take 100 mg by mouth daily, Starting Wed 1/24/2018, Historical Med      metFORMIN (GLUCOPHAGE) 500 mg tablet Take 1,000 mg by mouth daily, Starting Wed 1/24/2018, Historical Med      risperiDONE (RisperDAL) 0 5 mg tablet Take 0 5 mg by mouth 2 (two) times a day, Starting Wed 5/2/2018, Historical Med      TESTOSTERONE CYPIONATE IM Inject 300 mg into a muscle every 14 (fourteen) days, Starting Wed 1/23/2019, Historical Med      escitalopram (LEXAPRO) 10 mg tablet Take 10 mg by mouth daily, Starting Wed 8/9/2017, Historical Med      magnesium oxide (MAGOX 400) 400 mg Take 400 mg by mouth daily, Historical Med      Multiple Vitamins-Minerals (MULTIVITAMIN ADULT PO) Take 1 tablet by mouth daily, Historical Med         STOP taking these medications       chlorthalidone 25 mg tablet Comments:   Reason for Stopping:               Outpatient Discharge Orders  Glucometer     Glucometer test strips     CBC and Platelet   Standing Status: Future  Standing Exp   Date: 01/29/20     Discharge Diet     No strenuous exercise     Call provider for: active or persistent bleeding     Call provider for:  persistent dizziness or light-headedness         ED Provider  Electronically Signed by           Rae Zavala DO  01/31/19 0717

## 2019-01-26 NOTE — ASSESSMENT & PLAN NOTE
· Patient has chronic ITP  · Presently, patient's platelets are 3356  · Had some minor bruising on his left hand, dorsal side, with minor bleeding that has stopped  · Hematology consult  · Case discussed with Dr Kashif Corey, hematologist, and from our discussion, we will start the patient on prednisone 100 mg once a day; we will give IV IG 1 gram/kilogram daily for 2 days (this was also discussed with the pharmacist)  With some bleeding, although minor, at this point in time, he is okay with the platelet transfusion that was ordered in the emergency room, for now, however, he told me that with ITP, this will just likely be destroyed  He also told me that it is not common for ITP to bleed  · With lightheadedness and headache, emergency room physician ordered for a stat CT scan of the head to rule out any bleed  Will follow up the results  · Monitor  · Watch out for any signs and symptoms of bleeding

## 2019-01-26 NOTE — ED NOTES
Patient transported to [place] [id number] , [address] [facility]  01/26/19 [address] [place] [address]

## 2019-01-27 LAB
ABO GROUP BLD BPU: NORMAL
ANION GAP SERPL CALCULATED.3IONS-SCNC: 11 MMOL/L (ref 4–13)
ATRIAL RATE: 54 BPM
ATRIAL RATE: 55 BPM
BILIRUB UR QL STRIP: NEGATIVE
BPU ID: NORMAL
BUN SERPL-MCNC: 15 MG/DL (ref 5–25)
CALCIUM SERPL-MCNC: 9 MG/DL (ref 8.3–10.1)
CHLORIDE SERPL-SCNC: 105 MMOL/L (ref 100–108)
CLARITY UR: CLEAR
CO2 SERPL-SCNC: 20 MMOL/L (ref 21–32)
COLOR UR: YELLOW
CREAT SERPL-MCNC: 1.12 MG/DL (ref 0.6–1.3)
ERYTHROCYTE [DISTWIDTH] IN BLOOD BY AUTOMATED COUNT: 16.6 % (ref 11.6–15.1)
EST. AVERAGE GLUCOSE BLD GHB EST-MCNC: 123 MG/DL
GFR SERPL CREATININE-BSD FRML MDRD: 68 ML/MIN/1.73SQ M
GLUCOSE SERPL-MCNC: 115 MG/DL (ref 65–140)
GLUCOSE SERPL-MCNC: 126 MG/DL (ref 65–140)
GLUCOSE SERPL-MCNC: 133 MG/DL (ref 65–140)
GLUCOSE SERPL-MCNC: 186 MG/DL (ref 65–140)
GLUCOSE SERPL-MCNC: 240 MG/DL (ref 65–140)
GLUCOSE UR STRIP-MCNC: NEGATIVE MG/DL
HBA1C MFR BLD: 5.9 % (ref 4.2–6.3)
HCT VFR BLD AUTO: 31.7 % (ref 36.5–49.3)
HGB BLD-MCNC: 9.6 G/DL (ref 12–17)
HGB UR QL STRIP.AUTO: NEGATIVE
KETONES UR STRIP-MCNC: NEGATIVE MG/DL
LEUKOCYTE ESTERASE UR QL STRIP: NEGATIVE
MCH RBC QN AUTO: 27.7 PG (ref 26.8–34.3)
MCHC RBC AUTO-ENTMCNC: 30.3 G/DL (ref 31.4–37.4)
MCV RBC AUTO: 91 FL (ref 82–98)
NITRITE UR QL STRIP: NEGATIVE
P AXIS: 33 DEGREES
P AXIS: 34 DEGREES
PH UR STRIP.AUTO: 5.5 [PH] (ref 4.5–8)
PLATELET # BLD AUTO: 29 THOUSANDS/UL (ref 149–390)
PMV BLD AUTO: 12 FL (ref 8.9–12.7)
POTASSIUM SERPL-SCNC: 4.2 MMOL/L (ref 3.5–5.3)
PR INTERVAL: 170 MS
PR INTERVAL: 170 MS
PROT UR STRIP-MCNC: NEGATIVE MG/DL
QRS AXIS: -3 DEGREES
QRS AXIS: -7 DEGREES
QRSD INTERVAL: 88 MS
QRSD INTERVAL: 88 MS
QT INTERVAL: 430 MS
QT INTERVAL: 450 MS
QTC INTERVAL: 411 MS
QTC INTERVAL: 426 MS
RBC # BLD AUTO: 3.47 MILLION/UL (ref 3.88–5.62)
SODIUM SERPL-SCNC: 136 MMOL/L (ref 136–145)
SP GR UR STRIP.AUTO: <=1.005 (ref 1–1.03)
T WAVE AXIS: 7 DEGREES
T WAVE AXIS: 7 DEGREES
UNIT DISPENSE STATUS: NORMAL
UNIT PRODUCT CODE: NORMAL
UNIT RH: NORMAL
UROBILINOGEN UR QL STRIP.AUTO: 0.2 E.U./DL
VENTRICULAR RATE: 54 BPM
VENTRICULAR RATE: 55 BPM
WBC # BLD AUTO: 3.3 THOUSAND/UL (ref 4.31–10.16)

## 2019-01-27 PROCEDURE — 99222 1ST HOSP IP/OBS MODERATE 55: CPT | Performed by: INTERNAL MEDICINE

## 2019-01-27 PROCEDURE — 83036 HEMOGLOBIN GLYCOSYLATED A1C: CPT | Performed by: INTERNAL MEDICINE

## 2019-01-27 PROCEDURE — 99232 SBSQ HOSP IP/OBS MODERATE 35: CPT | Performed by: NURSE PRACTITIONER

## 2019-01-27 PROCEDURE — 93010 ELECTROCARDIOGRAM REPORT: CPT | Performed by: INTERNAL MEDICINE

## 2019-01-27 PROCEDURE — 82948 REAGENT STRIP/BLOOD GLUCOSE: CPT

## 2019-01-27 PROCEDURE — 80048 BASIC METABOLIC PNL TOTAL CA: CPT | Performed by: INTERNAL MEDICINE

## 2019-01-27 PROCEDURE — 81003 URINALYSIS AUTO W/O SCOPE: CPT | Performed by: NURSE PRACTITIONER

## 2019-01-27 PROCEDURE — 85027 COMPLETE CBC AUTOMATED: CPT | Performed by: INTERNAL MEDICINE

## 2019-01-27 RX ORDER — ALBUTEROL SULFATE 90 UG/1
2 AEROSOL, METERED RESPIRATORY (INHALATION) EVERY 4 HOURS PRN
Status: DISCONTINUED | OUTPATIENT
Start: 2019-01-27 | End: 2019-01-29 | Stop reason: HOSPADM

## 2019-01-27 RX ORDER — LANOLIN ALCOHOL/MO/W.PET/CERES
6 CREAM (GRAM) TOPICAL
Status: DISCONTINUED | OUTPATIENT
Start: 2019-01-27 | End: 2019-01-29 | Stop reason: HOSPADM

## 2019-01-27 RX ORDER — HYDROCHLOROTHIAZIDE 25 MG/1
25 TABLET ORAL DAILY
Status: DISCONTINUED | OUTPATIENT
Start: 2019-01-27 | End: 2019-01-29 | Stop reason: HOSPADM

## 2019-01-27 RX ORDER — DOCUSATE SODIUM 100 MG/1
100 CAPSULE, LIQUID FILLED ORAL 2 TIMES DAILY
Status: DISCONTINUED | OUTPATIENT
Start: 2019-01-27 | End: 2019-01-29 | Stop reason: HOSPADM

## 2019-01-27 RX ORDER — POLYETHYLENE GLYCOL 3350 17 G/17G
17 POWDER, FOR SOLUTION ORAL DAILY
Status: DISCONTINUED | OUTPATIENT
Start: 2019-01-27 | End: 2019-01-29 | Stop reason: HOSPADM

## 2019-01-27 RX ADMIN — ACETAMINOPHEN 650 MG: 325 TABLET ORAL at 21:23

## 2019-01-27 RX ADMIN — INSULIN LISPRO 1 UNITS: 100 INJECTION, SOLUTION INTRAVENOUS; SUBCUTANEOUS at 21:21

## 2019-01-27 RX ADMIN — INSULIN LISPRO 2 UNITS: 100 INJECTION, SOLUTION INTRAVENOUS; SUBCUTANEOUS at 17:04

## 2019-01-27 RX ADMIN — LEVOTHYROXINE SODIUM 75 MCG: 75 TABLET ORAL at 06:27

## 2019-01-27 RX ADMIN — LOSARTAN POTASSIUM 100 MG: 50 TABLET, FILM COATED ORAL at 08:44

## 2019-01-27 RX ADMIN — Medication 60 G: at 20:52

## 2019-01-27 RX ADMIN — MELATONIN 6 MG: at 21:21

## 2019-01-27 RX ADMIN — INSULIN LISPRO 2 UNITS: 100 INJECTION, SOLUTION INTRAVENOUS; SUBCUTANEOUS at 08:49

## 2019-01-27 RX ADMIN — Medication 400 MG: at 08:44

## 2019-01-27 RX ADMIN — INSULIN LISPRO 1 UNITS: 100 INJECTION, SOLUTION INTRAVENOUS; SUBCUTANEOUS at 17:04

## 2019-01-27 RX ADMIN — ACETAMINOPHEN 650 MG: 325 TABLET ORAL at 16:55

## 2019-01-27 RX ADMIN — PANTOPRAZOLE SODIUM 40 MG: 40 TABLET, DELAYED RELEASE ORAL at 06:27

## 2019-01-27 RX ADMIN — PREDNISONE 100 MG: 20 TABLET ORAL at 08:44

## 2019-01-27 RX ADMIN — INSULIN DETEMIR 10 UNITS: 100 INJECTION, SOLUTION SUBCUTANEOUS at 21:20

## 2019-01-27 RX ADMIN — RISPERIDONE 0.5 MG: 0.25 TABLET ORAL at 08:44

## 2019-01-27 RX ADMIN — RISPERIDONE 0.5 MG: 0.25 TABLET ORAL at 16:55

## 2019-01-27 RX ADMIN — ACETAMINOPHEN 650 MG: 325 TABLET ORAL at 02:30

## 2019-01-27 RX ADMIN — Medication 60 G: at 16:56

## 2019-01-27 RX ADMIN — Medication 1 TABLET: at 08:44

## 2019-01-27 RX ADMIN — LORATADINE 10 MG: 10 TABLET ORAL at 08:44

## 2019-01-27 RX ADMIN — HYDROCHLOROTHIAZIDE 25 MG: 25 TABLET ORAL at 12:25

## 2019-01-27 RX ADMIN — ATORVASTATIN CALCIUM 40 MG: 40 TABLET, FILM COATED ORAL at 08:44

## 2019-01-27 RX ADMIN — ESCITALOPRAM OXALATE 10 MG: 10 TABLET ORAL at 08:44

## 2019-01-27 RX ADMIN — ACETAMINOPHEN 650 MG: 325 TABLET ORAL at 12:25

## 2019-01-27 RX ADMIN — FERROUS SULFATE TAB 325 MG (65 MG ELEMENTAL FE) 325 MG: 325 (65 FE) TAB at 08:44

## 2019-01-27 RX ADMIN — DOCUSATE SODIUM 100 MG: 100 CAPSULE, LIQUID FILLED ORAL at 16:56

## 2019-01-27 RX ADMIN — DOCUSATE SODIUM 100 MG: 100 CAPSULE, LIQUID FILLED ORAL at 10:01

## 2019-01-27 RX ADMIN — POLYETHYLENE GLYCOL 3350 17 G: 17 POWDER, FOR SOLUTION ORAL at 10:01

## 2019-01-27 RX ADMIN — INSULIN LISPRO 2 UNITS: 100 INJECTION, SOLUTION INTRAVENOUS; SUBCUTANEOUS at 12:27

## 2019-01-27 NOTE — ASSESSMENT & PLAN NOTE
· Patient has chronic ITP  Presented with platelet count of 0711  · CT head: No acute intracranial abnormality  · Had some minor bruising on his left hand, dorsal side, with minor bleeding that has stopped  · Hematology consult appreciated   · Started patient on Prednisone 100 mg once a day and IVIG 1 gram/kilogram daily for 2 days (this was also discussed with the pharmacist)  · Given platelet transfusion that was ordered in the ER  However, per hematology, with ITP disease process - these PLTs will just likely be destroyed  Per hematology, it is not common for ITP to bleed as the new PLTs being made are still circulating   · Continue Prednisone and IVIG  · Monitor for increased in PLT in next 3 days  · Presently, PLT 4 -> 29  · Watch out for any signs and symptoms of bleeding   Monitor counts daily

## 2019-01-27 NOTE — UTILIZATION REVIEW
Initial Clinical Review    Admission: Date/Time/Statement: 1/26/19 @ 1306   Orders Placed This Encounter   Procedures    Inpatient Admission (expected length of stay for this patient is greater than two midnights)     Standing Status:   Standing     Number of Occurrences:   1     Order Specific Question:   Admitting Physician     Answer:   Bhavin Munoz [[id number]]     Order Specific Question:   Level of Care     Answer:   Med Surg [[id number]]     Order Specific Question:   Estimated length of stay     Answer:   More than 2 Midnights     Order Specific Question:   Certification     Answer:   I certify that inpatient services are medically necessary for this patient for a duration of greater than two midnights  See H&P and MD Progress Notes for additional information about the patient's course of treatment   Inpatient Admission     Standing Status:   Standing     Number of Occurrences:   1     Order Specific Question:   Admitting Physician     Answer:   Bhavin Munoz [[id number]]     Order Specific Question:   Level of Care     Answer:   Med Surg [[id number]]     Order Specific Question:   Bed request comments     Answer:   Telemetry     Order Specific Question:   Estimated length of stay     Answer:   More than 2 Midnights     Order Specific Question:   Certification     Answer:   I certify that inpatient services are medically necessary for this patient for a duration of greater than two midnights  See H&P and MD Progress Notes for additional information about the patient's course of treatment  ED: Date/Time/Mode of Arrival:   ED Arrival Information     Expected Arrival Acuity Means of Arrival Escorted By Service Admission Type    - 1/26/2019 10:24 Urgent Walk-In Self General Medicine Urgent    Arrival Complaint    CHEST PAIN /SOB        Chief Complaint:   Chief Complaint   Patient presents with    Chest Pain     pt reports CP, SOB, weakness for appox "24-48hours" Pt resides in [place] here visiting family   Pt recieved email from hematologist stating "my platelets are 8, she told me to get to a hospital right away" Pt reprots pain feels like "an elephant on my chest"  Pt further reprots recent passing of female figure and dog     History of Illness: Maxwell Beckman is a 79 y o  male who presents to the emergency room 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  Patient also has history of hypertension and diabetes mellitus  Patient is from [place], but is visiting here in [place] as he attended the  of her aunt  He told me that he had an episode of chest pain, when he woke up this morning  He described it as if an elephant was sitting on his chest   A few days ago, he received anemia from his doctor at [facility] and he was told that his platelet count is low at 8, as well as his testosterone level and vitamin-D level  He was instructed to checked up by both a hematologist and an endocrinologist   Patient was also prescribed with vitamin-D  Patient also has occasional lightheadedness and headache  Patient had some minor bruising on the left hand, that had a mild bleeding that has stopped  Otherwise no other bleeding  Patient denies any blood in the stools or blood in the urine or any bleeding in other parts of the body  Patient feels generally weak and admits to occasional shortness of breath  Patient denies any other symptoms other the ones mentioned above  ED Vital Signs:   ED Triage Vitals [19 1034]   Temperature Pulse Respirations Blood Pressure SpO2   98 5 °F (36 9 °C) 65 20 145/76 100 %      Temp Source Heart Rate Source Patient Position - Orthostatic VS BP Location FiO2 (%)   Oral Monitor Lying Right arm --      Pain Score       Worst Possible Pain        Wt Readings from Last 1 Encounters:   19 121 kg (266 lb 8 6 oz)     Vital Signs (abnormal):     Above    Pertinent Labs/Diagnostic Test Results:   H/H    10 3/32 7  Platelets     4  RBC   2 68  Creat   1 32  Albumin    3 1  Ct head:  No acute intracranial abnormality    ED Treatment:   Medication Administration from 01/26/2019 1024 to 01/26/2019 1359       Date/Time Order Dose Route Action Action by Comments     01/26/2019 1116 sodium chloride 0 9 % infusion 125 mL/hr Intravenous [address] Sol Hannah RN      01/26/2019 1117 nitroglycerin (NITRO-BID) 2 % TD ointment 1 inch 1 inch Topical Given Sol Hannah RN      01/26/2019 1314 predniSONE tablet 100 mg 100 mg Oral Given Sol Hannah RN         Past Medical/Surgical History: Active Ambulatory Problems     Diagnosis Date Noted    No Active Ambulatory Problems     Resolved Ambulatory Problems     Diagnosis Date Noted    No Resolved Ambulatory Problems     Past Medical History:   Diagnosis Date    Diabetes mellitus ([facility] )     Hypertension     Klinefelter syndrome with XY/XXY mosaicism      Admitting Diagnosis: Klinefelter syndrome [Q98 4]  Chest pain [R07 9]  Thrombocytopenia ([facility] [address] ) [D69 6]  Testosterone deficiency [E34 9]  History of migraine headaches [Z86 69]     Assessment/Plan: Thrombocytopenia ([facility] )   Assessment & Plan     · Patient has chronic ITP  · Presently, patient's platelets are 5158  · Had some minor bruising on his left hand, dorsal side, with minor bleeding that has stopped  · Hematology consult  · Case discussed with Dr Nadeen Baxter, hematologist, and from our discussion, we will start the patient on prednisone 100 mg once a day; we will give IV IG 1 gram/kilogram daily for 2 days (this was also discussed with the pharmacist)  With some bleeding, although minor, at this point in time, he is okay with the platelet transfusion that was ordered in the emergency room, for now, however, he told me that with ITP, this will just likely be destroyed  He also told me that it is not common for ITP to bleed    · With lightheadedness and headache, emergency room physician ordered for a stat CT scan of the head to rule out any bleed  Will follow up the results  · Monitor  · Watch out for any signs and symptoms of bleeding  Klinefelter syndrome   Assessment & Plan     · Due to this, patient has testosterone deficiency  · At [facility], [facility], patient's testosterone, done a few days ago, was very low  · Presently, with patient being evaluated for possibility of acute coronary syndrome, and testosterone known to increase the risk for MI, we will hold off any test also injection at this point, until totally rule out of any acute coronary syndrome  Presently, 1st troponin is negative  From my discussion with him, if totally rule out from any myocardial infarction, we will start the testosterone injection tomorrow  · Patient requests to see an endocrinologist, as he will stay here in [place], for longer duration prior to going back to [facility]       Acute kidney injury [facility])   Assessment & Plan     · Based on my review of patient's previous BMP in [facility], patient's baseline serum creatinine is around 0 84 to 1 0   · Likely prerenal   · Monitor  · IV fluids  · Avoid nephrotoxins  · Avoid hypotension  · Hold off chlorthalidone in the meantime  · If patient's kidney function is worsening, consider holding off losartan also  · For further workup and management if this worsens significantly or if no improvement  Vitamin D deficiency   Assessment & Plan     · According the patient, he was just diagnosed again with vitamin-D deficiency  He has this before  · Thus according the patient, he was just prescribed with vitamin D 50,000 per week  Thus will start this medication today  · Outpatient follow-up with primary care physician       Hypertension   Assessment & Plan     · Continue blood pressure medications       Type 2 diabetes mellitus (HCC)     Anemia   Assessment & Plan     · Chronic  · Mild  · Monitor    · For further workup management if this worsens significantly       Chest pain   Assessment & Plan     · Rule out acute coronary syndrome  · No aspirin as patient has severe thrombocytopenia  · Trend patient's troponins  · Nitroglycerin p r n     · Oxygen p r n  [facility] · Hold off testosterone injection in the meantime until completely rule out of possibility of acute coronary syndrome    Anticipated Length of Stay:  Patient will be admitted on an Inpatient basis with an anticipated length of stay of  greater than 2 midnights     Justification for Hospital Stay:  Due to the above findings and plans           Admission Orders:   IP   1/26  @    1306  Scheduled Meds:   Current Facility-Administered Medications:  acetaminophen 650 mg Oral Q6H PRN Gerald Hummel MD    atorvastatin 40 mg Oral Daily Gerald Hummel MD    azelastine 1 spray Each Nare BID PRN Gerald Hummel MD    calcium carbonate 1,000 mg Oral Daily PRN Gerald Hummel MD    diphenhydrAMINE 25 mg Oral Q6H PRN Kamille Oliva PA-C    ergocalciferol 50,000 Units Oral Weekly Gerald Hummel MD    escitalopram 10 mg Oral Daily Gerald Hummel MD    ferrous sulfate 325 mg Oral Daily Gerald Hummel MD    immune globulin, human 120 g Intravenous Q24H Gerald Hummel MD    immune globulin, human 60 g Intravenous Q24H Gerald Hummel MD Last Rate: 60 g (01/26/19 1937)   And        immune globulin, human 60 g Intravenous Q24H Gerald Hummel MD Last Rate: 60 g (01/26/19 1804)   insulin detemir 10 Units Subcutaneous HS Gerald Hummel MD    insulin lispro 1-5 Units Subcutaneous TID AC Gerald Hummel MD    insulin lispro 1-5 Units Subcutaneous HS Gerald Hummel MD    insulin lispro 2 Units Subcutaneous Daily With Breakfast Gerald Hummel MD    insulin lispro 2 Units Subcutaneous Daily With Lunch Gerald Hummel MD    insulin lispro 2 Units Subcutaneous Daily With Jamila Hummel MD    levothyroxine 75 mcg Oral Daily Gerald BOATENG MD Shaheed    loratadine 10 mg Oral Daily Gerald Hummel MD    losartan 100 mg Oral Daily Gerald Hummel MD    magnesium oxide 400 mg Oral Daily Gerald Hummel MD    multivitamin-minerals 1 tablet Oral Daily Gerald Hummel MD    nitroglycerin 0 4 mg Sublingual Q5 Min PRN Gerald Hummel MD    ondansetron 4 mg Intravenous Q6H PRN Gerald Hummel MD    pantoprazole 40 mg Oral Early Morning Gerald Hummel MD    predniSONE 100 mg Oral Daily Gerald Hummel MD    risperiDONE 0 5 mg Oral BID Gerald Hummel MD    senna 1 tablet Oral HS PRN Gerald Hummel MD    sodium chloride 75 mL/hr Intravenous Continuous Gerald Hummel MD Last Rate: 75 mL/hr (01/26/19 1516)     Continuous Infusions:   sodium chloride 75 mL/hr Last Rate: 75 mL/hr (01/26/19 1516)     PRN Meds:   acetaminophen    azelastine    calcium carbonate    diphenhydrAMINE    nitroglycerin    ondansetron    senna     Tele  CCD diet  O2  2L  Cons endocrine  Serial troponin  1 U  platelets

## 2019-01-27 NOTE — PLAN OF CARE
CARDIOVASCULAR - ADULT     Maintains optimal cardiac output and hemodynamic stability Progressing     Absence of cardiac dysrhythmias or at baseline rhythm Progressing        DISCHARGE PLANNING     Discharge to home or other facility with appropriate resources Progressing        INFECTION - ADULT     Absence or prevention of progression during hospitalization Progressing     Absence of fever/infection during neutropenic period Progressing        Knowledge Deficit     Patient/family/caregiver demonstrates understanding of disease process, treatment plan, medications, and discharge instructions Progressing        METABOLIC, FLUID AND ELECTROLYTES - ADULT     Electrolytes maintained within normal limits Progressing     Fluid balance maintained Progressing     Glucose maintained within target range Progressing        Potential for Falls     Patient will remain free of falls Progressing        SAFETY ADULT     Maintain or return to baseline ADL function Progressing     Maintain or return mobility status to optimal level Progressing

## 2019-01-27 NOTE — CONSULTS
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 and is looking to establish with endocrinology up here  Notes fatigue and weakness  Notes adequate appetite  Did not sleep well last night  Takes testosterone 200 mg/ml and take 1 cc every 10-14 days for hypogonadism  States gels did not absorb in past     Review of Systems   Constitutional: Positive for fatigue  Negative for appetite change  HENT: Negative for congestion and trouble swallowing  Eyes: Negative for visual disturbance  Respiratory: Negative for shortness of breath  Cardiovascular: Negative for palpitations and leg swelling  Gastrointestinal: Negative for abdominal pain, nausea and vomiting  Endocrine: Negative for polydipsia and polyuria  Genitourinary: Negative for difficulty urinating and frequency  Musculoskeletal: Negative for arthralgias  Skin: Negative for rash  Neurological: Positive for weakness  Negative for dizziness  Psychiatric/Behavioral: Negative for agitation and confusion         Historical Information   Past Medical History:   Diagnosis Date    Diabetes mellitus ([address] )     Hypertension     Klinefelter syndrome with XY/XXY mosaicism      Past Surgical History:   Procedure Laterality Date    CHOLECYSTECTOMY      GASTRIC BYPASS  REDUCTION MAMMAPLASTY      THROMBECTOMY      TONSILECTOMY AND ADNOIDECTOMY       Social History   History   Alcohol Use    Yes     History   Drug Use No     History   Smoking Status    Never Smoker   Smokeless Tobacco    Never Used     Family History: History reviewed  No pertinent family history      Meds/Allergies   Current Facility-Administered Medications   Medication Dose Route Frequency Provider Last Rate Last Dose    acetaminophen (TYLENOL) tablet 650 mg  650 mg Oral Q6H PRN Gerald Hummel MD   650 mg at 01/27/19 0230    atorvastatin (LIPITOR) tablet 40 mg  40 mg Oral Daily Gerald Hummel MD   40 mg at 01/26/19 1528    azelastine (ASTELIN) 0 1 % nasal spray 1 spray  1 spray Each Nare BID PRN Gerald Hummel MD        calcium carbonate (TUMS) chewable tablet 1,000 mg  1,000 mg Oral Daily PRN Gerald Hummel MD   1,000 mg at 01/26/19 2154    diphenhydrAMINE (BENADRYL) tablet 25 mg  25 mg Oral Q6H PRN Kamille Oliva PA-C   25 mg at 01/26/19 2154    ergocalciferol (VITAMIN D2) capsule 50,000 Units  50,000 Units Oral Weekly Gerald Hummel MD        escitalopram (LEXAPRO) tablet 10 mg  10 mg Oral Daily Gerald Hummel MD   10 mg at 01/26/19 1527    ferrous sulfate tablet 325 mg  325 mg Oral Daily Gerald Hummel MD   325 mg at 01/26/19 1528    immune globulin, human (GAMUNEX-C) 60 g 600 mL IV soln  60 g Intravenous Q24H Gerald Hummel  mL/hr at 01/26/19 1937 60 g at 01/26/19 1937    And    immune globulin, human (GAMUNEX-C) 60 g 600 mL IV soln  60 g Intravenous Q24H Gerald Hummel  mL/hr at 01/26/19 1804 60 g at 01/26/19 1804    insulin detemir (LEVEMIR) subcutaneous injection 10 Units  10 Units Subcutaneous HS Gerald Hummel MD   10 Units at 01/26/19 2154    insulin lispro (HumaLOG) 100 units/mL subcutaneous injection 1-5 Units  1-5 Units Subcutaneous TID AC Gerald Hummel MD        insulin lispro (HumaLOG) 100 units/mL subcutaneous injection 1-5 Units  1-5 Units Subcutaneous HS Gerald Hummel MD   1 Units at 01/26/19 2154    insulin lispro (HumaLOG) 100 units/mL subcutaneous injection 2 Units  2 Units Subcutaneous Daily With Breakfast Gerald Hummel MD        insulin lispro (HumaLOG) 100 units/mL subcutaneous injection 2 Units  2 Units Subcutaneous Daily With Lunch Gerald Hummel MD        insulin lispro (HumaLOG) 100 units/mL subcutaneous injection 2 Units  2 Units Subcutaneous Daily With Dinner Marlena Berg MD   2 Units at 01/26/19 1615    levothyroxine tablet 75 mcg  75 mcg Oral Daily Gerald Hummel MD   75 mcg at 01/27/19 0184    loratadine (CLARITIN) tablet 10 mg  10 mg Oral Daily Gerald Hummel MD   10 mg at 01/26/19 1528    losartan (COZAAR) tablet 100 mg  100 mg Oral Daily Gerald Hummel MD   100 mg at 01/26/19 1527    magnesium oxide (MAG-OX) tablet 400 mg  400 mg Oral Daily Gerald Hummel MD   400 mg at 01/26/19 1528    multivitamin-minerals (CENTRUM) tablet 1 tablet  1 tablet Oral Daily Gerald Hummel MD   1 tablet at 01/26/19 1528    nitroglycerin (NITROSTAT) SL tablet 0 4 mg  0 4 mg Sublingual Q5 Min PRN Gerald Hummel MD        ondansetron (ZOFRAN) injection 4 mg  4 mg Intravenous Q6H PRN Gerald Hummel MD        pantoprazole (PROTONIX) EC tablet 40 mg  40 mg Oral Early Morning Gerald Hummel MD   40 mg at 01/27/19 8798    predniSONE tablet 100 mg  100 mg Oral Daily Gerald Hummel MD   100 mg at 01/26/19 1314    risperiDONE (RisperDAL) tablet 0 5 mg  0 5 mg Oral BID Gerald Hummel MD   0 5 mg at 01/26/19 1614    senna (SENOKOT) tablet 8 6 mg  1 tablet Oral HS PRN Gerald Hummel MD   8 6 mg at 01/26/19 2154    sodium chloride 0 9 % infusion  75 mL/hr Intravenous Continuous Gerald Hummel MD 75 mL/hr at 01/26/19 1516 75 mL/hr at 01/26/19 1516     Allergies   Allergen Reactions    Lactose     Vicodin [Hydrocodone-Acetaminophen] Hives       Objective   Vitals: Blood pressure (!) 173/81, pulse (!) 54, temperature 98 2 °F (36 8 °C), temperature source Oral, resp  rate 18, weight 121 kg (266 lb 8 6 oz), SpO2 98 %  Intake/Output Summary (Last 24 hours) at 01/27/19 0804  Last data filed at 01/27/19 0601   Gross per 24 hour   Intake                0 ml   Output             1900 ml   Net            -1900 ml     Invasive Devices     Peripheral Intravenous Line            Peripheral IV 01/26/19 Right Forearm less than 1 day                Physical Exam   Constitutional: He is oriented to person, place, and time  He appears well-developed and well-nourished  No distress  HENT:   Head: Normocephalic and atraumatic  Eyes: Pupils are equal, round, and reactive to light  Conjunctivae are normal    Neck: Normal range of motion  Neck supple  Cardiovascular: Normal rate and regular rhythm  No murmur heard  Pulmonary/Chest: Effort normal and breath sounds normal  No respiratory distress  Abdominal: Soft  Bowel sounds are normal  He exhibits no distension  Musculoskeletal: Normal range of motion  He exhibits no edema  Neurological: He is alert and oriented to person, place, and time  He exhibits normal muscle tone  Skin: Skin is warm and dry  No rash noted  He is not diaphoretic  Psychiatric: He has a normal mood and affect  His behavior is normal    Vitals reviewed  The history was obtained from the review of the chart, patient      Lab Results:       Lab Results   Component Value Date    WBC 3 30 (L) 01/27/2019    HGB 9 6 (L) 01/27/2019    HCT 31 7 (L) 01/27/2019    MCV 91 01/27/2019    PLT 29 (LL) 01/27/2019     Lab Results   Component Value Date/Time    BUN 15 01/27/2019 04:31 AM    K 4 2 01/27/2019 04:31 AM     01/27/2019 04:31 AM    CO2 20 (L) 01/27/2019 04:31 AM    CREATININE 1 12 01/27/2019 04:31 AM    AST 21 01/26/2019 11:09 AM    ALT 22 01/26/2019 11:09 AM    ALB 3 1 (L) 01/26/2019 11:09 AM     No results for input(s): CHOL, HDL, LDL, TRIG, VLDL in the last 72 hours  No results found for: Evelin Cunningham  POC Glucose (mg/dl)   Date Value   01/26/2019 176 (H)   01/26/2019 131       Imaging Studies: I have personally reviewed pertinent reports  CT head without contrast [[id number]] Collected: 01/26/19 1235   Order Status: Completed Updated: 01/26/19 1239   Narrative:     CT BRAIN - WITHOUT CONTRAST    INDICATION:   recent migraine, ITP     COMPARISON:  None  TECHNIQUE:  CT examination of the brain was performed   In addition to axial images, coronal 2D reformatted images were created and submitted for interpretation       Radiation dose length product (DLP) for this visit:  703 mGy-cm    This examination, like all CT scans performed in the [facility], was performed utilizing techniques to minimize radiation dose exposure, including the use of iterative   reconstruction and automated exposure control       IMAGE QUALITY:  Diagnostic  FINDINGS:    PARENCHYMA:  No intracranial mass, mass effect or midline shift  No CT signs of acute infarction   No acute parenchymal hemorrhage   Volume loss consistent with age  VENTRICLES AND EXTRA-AXIAL SPACES:  Normal for the patient's age  VISUALIZED ORBITS AND PARANASAL SINUSES:  Unremarkable  CALVARIUM AND EXTRACRANIAL SOFT TISSUES:  Normal    Impression:       No acute intracranial abnormality  Portions of the record may have been created with voice recognition software  Occasional wrong word or "sound a like" substitutions may have occurred due to the inherent limitations of voice recognition software  Read the chart carefully and recognize, using context, where substitutions have occurred

## 2019-01-27 NOTE — ASSESSMENT & PLAN NOTE
· SBP ranged from 157-173 over 24 hours  · Continue blood pressure medications, Cozaar and resume HCTZ  · Reduce IVF

## 2019-01-27 NOTE — ASSESSMENT & PLAN NOTE
· Chronic   HGB 10 3 -> 9 6 gm/dL  · Monitor for excessive bruising/ bleeding with thrombocytopenia   · Monitor counts daily

## 2019-01-27 NOTE — ASSESSMENT & PLAN NOTE
· Rule out acute coronary syndrome  · No aspirin as patient has severe thrombocytopenia    · Troponin levels negative x4  · Nitroglycerin + Oxygen PRN  · Hold off testosterone injection and follow-up with endocrine as outpatient

## 2019-01-27 NOTE — ASSESSMENT & PLAN NOTE
· Based on my review of patient's previous BMP in [place], patient's baseline serum creatinine is around 0 84 to 1 0   · Likely prerenal   · Cr 1 32 -> 1 12 with IVF and holding HCTZ  · Resume HCTZ  · Avoid nephrotoxins and hypotension  · Monitor Cr in am

## 2019-01-27 NOTE — ASSESSMENT & PLAN NOTE
· According the patient, he was just diagnosed again with vitamin-D deficiency  · Continue vitamin D 50,000 per week  · Outpatient follow-up with primary care physician

## 2019-01-27 NOTE — CONSULTS
Oncology Consult Note  Reda No 79 y o  male MRN: [id number]  Unit/Bed#: -01 Encounter: [id number]      Presenting Complaint:  History of chronic ITP now with a flare and a platelet count of 1054    History of Presenting Illness: The patient is a pleasant 51-year-old male with a past medical history of ITP who got a call from his physicians in [place] that his platelet count was low  He came to the emergency room and was found to have a platelet count of 3070  It seems he had a flare/reactivation of his ITP with increased consumption  I discussed the case with our colleagues Internal Medicine yesterday and advised starting IVIG 1 gram/kilogram daily for 2 days  I also advised starting prednisone 100 mg daily  This was done and his platelet count has come up above 20 over night  The patient states he is planning on staying a few weeks before he goes to [place] and I explained to him he will be on the steroid probably when he goes back and he can resume the taper over there  As far symptoms are concerned he denies any bleeding  Denies any focal neurological signs  Did have a slight headache yesterday an intracranial bleed was ruled out  The rest of his 14 point review of systems today was negative  Review of Systems - As stated in the HPI otherwise the fourteen point review of systems was negative  Past Medical History:   Diagnosis Date    Diabetes mellitus ([address] )     Hypertension     Klinefelter syndrome with XY/XXY mosaicism        Social History     Social History    Marital status:      Spouse name: N/A    Number of children: N/A    Years of education: N/A     Social History Main Topics    Smoking status: Never Smoker    Smokeless tobacco: Never Used    Alcohol use Yes    Drug use: No    Sexual activity: No     Other Topics Concern    None     Social History Narrative    None       History reviewed  No pertinent family history      Allergies   Allergen Reactions    Lactose     Vicodin [Hydrocodone-Acetaminophen] Hives         Current Facility-Administered Medications:     acetaminophen (TYLENOL) tablet 650 mg, 650 mg, Oral, Q6H PRN, Gerald Hummel MD, 650 mg at 01/27/19 0230    atorvastatin (LIPITOR) tablet 40 mg, 40 mg, Oral, Daily, Gerald Hummel MD, 40 mg at 01/27/19 0844    azelastine (ASTELIN) 0 1 % nasal spray 1 spray, 1 spray, Each Nare, BID PRN, Gerald Hummel MD    calcium carbonate (TUMS) chewable tablet 1,000 mg, 1,000 mg, Oral, Daily PRN, Gerald Hummel MD, 1,000 mg at 01/26/19 2154    diphenhydrAMINE (BENADRYL) tablet 25 mg, 25 mg, Oral, Q6H PRN, Kamille Oliva PA-C, 25 mg at 01/26/19 2154    docusate sodium (COLACE) capsule 100 mg, 100 mg, Oral, BID, KATIANA Agosto    ergocalciferol (VITAMIN D2) capsule 50,000 Units, 50,000 Units, Oral, Weekly, Gerald Hummel MD    escitalopram (LEXAPRO) tablet 10 mg, 10 mg, Oral, Daily, Gerald Hummel MD, 10 mg at 01/27/19 0844    ferrous sulfate tablet 325 mg, 325 mg, Oral, Daily, Gerald Hummel MD, 325 mg at 01/27/19 0844    immune globulin, human (GAMUNEX-C) 60 g 600 mL IV soln, 60 g, Intravenous, Q24H, Last Rate: 216 mL/hr at 01/26/19 1804, 60 g at 01/26/19 1804 **AND** immune globulin, human (GAMUNEX-C) 60 g 600 mL IV soln, 60 g, Intravenous, Q24H, Gerald Hummel MD, Last Rate: 216 mL/hr at 01/26/19 1937, 60 g at 01/26/19 1937    insulin detemir (LEVEMIR) subcutaneous injection 10 Units, 10 Units, Subcutaneous, HS, Gerald Hummel MD, 10 Units at 01/26/19 2154    insulin lispro (HumaLOG) 100 units/mL subcutaneous injection 1-5 Units, 1-5 Units, Subcutaneous, TID AC **AND** Fingerstick Glucose (POCT), , , TID AC, Gerald Hummel MD    insulin lispro (HumaLOG) 100 units/mL subcutaneous injection 1-5 Units, 1-5 Units, Subcutaneous, HS, Gerald Hummel MD, 1 Units at 01/26/19 2154    insulin lispro (HumaLOG) 100 units/mL subcutaneous injection 2 Units, 2 Units, Subcutaneous, Daily With Breakfast, Gerald Hummel MD, 2 Units at 01/27/19 0849    insulin lispro (HumaLOG) 100 units/mL subcutaneous injection 2 Units, 2 Units, Subcutaneous, Daily With Lunch, Gerald Hummel MD    insulin lispro (HumaLOG) 100 units/mL subcutaneous injection 2 Units, 2 Units, Subcutaneous, Daily With Agustín Boris Hummel MD, 2 Units at 01/26/19 1615    levothyroxine tablet 75 mcg, 75 mcg, Oral, Daily, Gerald Hummel MD, 75 mcg at 01/27/19 0627    loratadine (CLARITIN) tablet 10 mg, 10 mg, Oral, Daily, Gerald Hummel MD, 10 mg at 01/27/19 0844    losartan (COZAAR) tablet 100 mg, 100 mg, Oral, Daily, eGrald Hummel MD, 100 mg at 01/27/19 0844    magnesium oxide (MAG-OX) tablet 400 mg, 400 mg, Oral, Daily, Gerald Hummel MD, 400 mg at 01/27/19 0844    multivitamin-minerals (CENTRUM) tablet 1 tablet, 1 tablet, Oral, Daily, Gerald Hummel MD, 1 tablet at 01/27/19 0844    nitroglycerin (NITROSTAT) SL tablet 0 4 mg, 0 4 mg, Sublingual, Q5 Min PRN, Gerald Hummel MD    ondansetron (ZOFRAN) injection 4 mg, 4 mg, Intravenous, Q6H PRN, Gerald Hummel MD    pantoprazole (PROTONIX) EC tablet 40 mg, 40 mg, Oral, Early Morning, Gerald Hummel MD, 40 mg at 01/27/19 0627    polyethylene glycol (MIRALAX) packet 17 g, 17 g, Oral, Daily, KATIANA Agosto    predniSONE tablet 100 mg, 100 mg, Oral, Daily, Gerald Hummel MD, 100 mg at 01/27/19 0844    risperiDONE (RisperDAL) tablet 0 5 mg, 0 5 mg, Oral, BID, Gerald Hummel MD, 0 5 mg at 01/27/19 0844    senna (SENOKOT) tablet 8 6 mg, 1 tablet, Oral, HS PRN, Gerald Hummel MD, 8 6 mg at 01/26/19 2154    sodium chloride 0 9 % infusion, 75 mL/hr, Intravenous, Continuous, Gerald Hummel MD, Last Rate: 75 mL/hr at 01/26/19 1516, 75 mL/hr at 01/26/19 1516      BP (!) 173/81 (BP Location: Left arm)   Pulse (!) 54   Temp 98 2 °F (36 8 °C) (Oral)   Resp 18   Wt 121 kg (266 lb 8 6 oz)   SpO2 98%     General Appearance:    Alert, oriented        Eyes:    PERRL   Ears:    Normal external ear canals, both ears   Nose:   Nares normal, septum midline   Throat:   Mucosa moist  Pharynx without injection  Neck:   Supple       Lungs:     Clear to auscultation bilaterally   Chest Wall:    No tenderness or deformity    Heart:    Regular rate and rhythm       Abdomen:     Soft, non-tender, bowel sounds +, no organomegaly           Extremities:   Extremities no cyanosis or edema       Skin:   no rash or icterus      Lymph nodes:   Cervical, supraclavicular, and axillary nodes normal   Neurologic:   CNII-XII intact, normal strength, sensation and reflexes     Throughout               Recent Results (from the past 48 hour(s))   ECG 12 lead    Collection Time: 01/26/19 10:35 AM   Result Value Ref Range    Ventricular Rate 55 BPM    Atrial Rate 55 BPM    LA Interval 170 ms    QRSD Interval 88 ms    QT Interval 430 ms    QTC Interval 411 ms    P Axis 34 degrees    QRS Axis -3 degrees    T Wave Axis 7 degrees   CBC and differential    Collection Time: 01/26/19 11:09 AM   Result Value Ref Range    WBC 4 57 4 31 - 10 16 Thousand/uL    RBC 3 68 (L) 3 88 - 5 62 Million/uL    Hemoglobin 10 3 (L) 12 0 - 17 0 g/dL    Hematocrit 32 7 (L) 36 5 - 49 3 %    MCV 89 82 - 98 fL    MCH 28 0 26 8 - 34 3 pg    MCHC 31 5 31 4 - 37 4 g/dL    RDW 16 4 (H) 11 6 - 15 1 %    Platelets 4 (LL) 357 - 390 Thousands/uL    nRBC 0 /100 WBCs    Neutrophils Relative 69 43 - 75 %    Immat GRANS % 0 0 - 2 %    Lymphocytes Relative 22 14 - 44 %    Monocytes Relative 7 4 - 12 %    Eosinophils Relative 1 0 - 6 %    Basophils Relative 1 0 - 1 %    Neutrophils Absolute 3 13 1 85 - 7 62 Thousands/µL    Immature Grans Absolute 0 01 0 00 - 0 20 Thousand/uL    Lymphocytes Absolute 1 01 0 60 - 4 47 Thousands/µL    Monocytes Absolute 0 33 0 17 - 1 22 Thousand/µL    Eosinophils Absolute 0 05 0 00 - 0 61 Thousand/µL    Basophils Absolute 0 04 0 00 - 0 10 Thousands/µL   Protime-INR    Collection Time: 01/26/19 11:09 AM   Result Value Ref Range    Protime 12 6 11 8 - 14 2 seconds    INR 0 97 0 86 - 1 17   APTT    Collection Time: 01/26/19 11:09 AM   Result Value Ref Range    PTT 28 26 - 38 seconds   Comprehensive metabolic panel    Collection Time: 01/26/19 11:09 AM   Result Value Ref Range    Sodium 138 136 - 145 mmol/L    Potassium 3 6 3 5 - 5 3 mmol/L    Chloride 103 100 - 108 mmol/L    CO2 23 21 - 32 mmol/L    ANION GAP 12 4 - 13 mmol/L    BUN 14 5 - 25 mg/dL    Creatinine 1 32 (H) 0 60 - 1 30 mg/dL    Glucose 120 65 - 140 mg/dL    Calcium 9 1 8 3 - 10 1 mg/dL    AST 21 5 - 45 U/L    ALT 22 12 - 78 U/L    Alkaline Phosphatase 126 (H) 46 - 116 U/L    Total Protein 7 1 6 4 - 8 2 g/dL    Albumin 3 1 (L) 3 5 - 5 0 g/dL    Total Bilirubin 0 50 0 20 - 1 00 mg/dL    eGFR 56 ml/min/1 73sq m   Type and screen    Collection Time: 01/26/19 11:09 AM   Result Value Ref Range    ABO Grouping AB     Rh Factor Positive     Antibody Screen Negative     Specimen Expiration Date [id number]    Troponin I    Collection Time: 01/26/19 11:09 AM   Result Value Ref Range    Troponin I <0 02 <=0 04 ng/mL   Magnesium    Collection Time: 01/26/19 11:09 AM   Result Value Ref Range    Magnesium 1 9 1 6 - 2 6 mg/dL   Troponin I    Collection Time: 01/26/19  3:00 PM   Result Value Ref Range    Troponin I <0 02 <=0 04 ng/mL   Fingerstick Glucose (POCT)    Collection Time: 01/26/19  3:44 PM   Result Value Ref Range    POC Glucose 131 65 - 140 mg/dl   ECG 12 lead    Collection Time: 01/26/19  4:13 PM   Result Value Ref Range    Ventricular Rate 54 BPM    Atrial Rate 54 BPM    NE Interval 170 ms    QRSD Interval 88 ms    QT Interval 450 ms    QTC Interval 426 ms    P Axis 33 degrees    QRS Axis -7 degrees    T Wave Axis 7 degrees   Troponin I    Collection Time: 01/26/19  6:29 PM   Result Value Ref Range    Troponin I <0 02 <=0 04 ng/mL   Fingerstick Glucose (POCT)    Collection Time: 01/26/19  8:47 PM   Result Value Ref Range    POC Glucose 176 (H) 65 - 140 mg/dl   Troponin I    Collection Time: 01/26/19  9:29 PM   Result Value Ref Range    Troponin I <0 02 <=0 04 ng/mL   CBC (With Platelets)    Collection Time: 01/27/19  4:31 AM   Result Value Ref Range    WBC 3 30 (L) 4 31 - 10 16 Thousand/uL    RBC 3 47 (L) 3 88 - 5 62 Million/uL    Hemoglobin 9 6 (L) 12 0 - 17 0 g/dL    Hematocrit 31 7 (L) 36 5 - 49 3 %    MCV 91 82 - 98 fL    MCH 27 7 26 8 - 34 3 pg    MCHC 30 3 (L) 31 4 - 37 4 g/dL    RDW 16 6 (H) 11 6 - 15 1 %    Platelets 29 (LL) 098 - 390 Thousands/uL    MPV 12 0 8 9 - 12 7 fL   Basic metabolic panel    Collection Time: 01/27/19  4:31 AM   Result Value Ref Range    Sodium 136 136 - 145 mmol/L    Potassium 4 2 3 5 - 5 3 mmol/L    Chloride 105 100 - 108 mmol/L    CO2 20 (L) 21 - 32 mmol/L    ANION GAP 11 4 - 13 mmol/L    BUN 15 5 - 25 mg/dL    Creatinine 1 12 0 60 - 1 30 mg/dL    Glucose 133 65 - 140 mg/dL    Calcium 9 0 8 3 - 10 1 mg/dL    eGFR 68 ml/min/1 73sq m   Prepare platelet pheresis:Transfusion Indications: Prophylactic in stable, non-bleeding inpatient with platelet count < or = 10,000; Special Requirements: None; Has consent been obtained? Yes, 1 Units    Collection Time: 01/27/19  5:47 AM   Result Value Ref Range    Unit Product Code S8771A04     Unit Number [id number]     Unit ABO A     Unit [facility] POS     Unit Dispense Status Presumed Trans    Fingerstick Glucose (POCT)    Collection Time: 01/27/19  7:35 AM   Result Value Ref Range    POC Glucose 126 65 - 140 mg/dl         Ct Head Without Contrast    Result Date: 1/26/2019  Narrative: CT BRAIN - WITHOUT CONTRAST INDICATION:   recent migraine, ITP  COMPARISON:  None  TECHNIQUE:  CT examination of the brain was performed  In addition to axial images, coronal 2D reformatted images were created and submitted for interpretation    Radiation dose length product (DLP) for this visit:  914 mGy-cm    This examination, like all CT scans performed in the [facility], was performed utilizing techniques to minimize radiation dose exposure, including the use of iterative reconstruction and automated exposure control  IMAGE QUALITY:  Diagnostic  FINDINGS: PARENCHYMA:  No intracranial mass, mass effect or midline shift  No CT signs of acute infarction  No acute parenchymal hemorrhage  Volume loss consistent with age  VENTRICLES AND EXTRA-AXIAL SPACES:  Normal for the patient's age  VISUALIZED ORBITS AND PARANASAL SINUSES:  Unremarkable  CALVARIUM AND EXTRACRANIAL SOFT TISSUES:  Normal      Impression: No acute intracranial abnormality  Workstation performed: [id number]       Assessment and Plan: This is an unfortunate 53-year-old male with a past medical history of 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 and hypothyroidism along with chronic ITP who presented to the hospital with a platelet count of 1107  He did have a cut on his left hand with minor bleeding which stop  I discussed the case with the hospitalist yesterday and I advised giving IVIG 1 g per kg per day for 2 days  I also advised starting prednisone 100 mg daily  He was started on this and his platelet count has come up above 20  I would recommend continuing the IV Ig as planned  Once his platelet count reached a safe level needs medically stable for discharge he can be discharged on the prednisone with very slow taper of 10 mg every week  He tells me he is going back home in a few weeks  I advised him to continue his taper when he gets discharged and then follow up with his primary team in [place]  Again once his platelets are up and he is ready to be discharged he can start a slow taper with a decrease of 10 mg every week from a discharge dose of 100 mg daily  This would be at least a 10-12 week taper    If he is going back to [place] he can be follow up there with his existing team

## 2019-01-27 NOTE — ASSESSMENT & PLAN NOTE
· Due to this, patient has testosterone deficiency  · Usually takes Testosterone 200 mg/mL, 1 mL every 10-14 days for hypogonadism   · At [facility], [facility], patient's testosterone, done a few days ago, was very low  · Presently, with patient being evaluated for possibility of acute coronary syndrome, and testosterone known to increase the risk for MI, we will hold off any test also injection at this point, until totally rule out of any acute coronary syndrome       · Endocrinology consult appreciated   · Patient will establish care with endocrine as outpatient

## 2019-01-27 NOTE — ASSESSMENT & PLAN NOTE
No results found for: HGBA1C    Recent Labs      01/26/19   1544  01/26/19   2047  01/27/19   0735   POCGLU  131  176*  126       Blood Sugar Average: Last 72 hrs:  (P) 794 [id number]   · A1c pending   · Expect steroid induced hyperglycemia   · Endocrinology consult appreciated   · Continue Levimir 10 units QHS  · Monitor ACCU checks AC+HS, Lispro insulin sliding scale   · I expect patient's blood sugars to go up as patient will be on prednisone  · Can likely resume Metformin as outpatient   · Will follow-up with endocrine for Klinefelters syndrome, hypogonadism, and T2DM

## 2019-01-27 NOTE — PROGRESS NOTES
Progress Note - Daya Palumbo 1950, 79 y o  male MRN: [id number]    Unit/Bed#: -01 Encounter: [id number]    Primary Care Provider: No primary care provider on file  Date and time admitted to hospital: 1/26/2019 10:27 AM        * Thrombocytopenia ([address] )   Assessment & Plan    · Patient has chronic ITP  Presented with platelet count of 9881  · CT head: No acute intracranial abnormality  · Had some minor bruising on his left hand, dorsal side, with minor bleeding that has stopped  · Hematology consult appreciated   · Started patient on Prednisone 100 mg once a day and IVIG 1 gram/kilogram daily for 2 days (this was also discussed with the pharmacist)  · Given platelet transfusion that was ordered in the ER  However, per hematology, with ITP disease process - these PLTs will just likely be destroyed  Per hematology, it is not common for ITP to bleed as the new PLTs being made are still circulating   · Continue Prednisone and IVIG  · Monitor for increased in PLT in next 3 days  · Presently, PLT 4 -> 29  · Watch out for any signs and symptoms of bleeding  Monitor counts daily      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   Assessment & Plan    · Due to this, patient has testosterone deficiency  · Usually takes Testosterone 200 mg/mL, 1 mL every 10-14 days for hypogonadism   · At [facility], at Care everywhere, patient's testosterone, done a few days ago, was very low  · Presently, with patient being evaluated for possibility of acute coronary syndrome, and testosterone known to increase the risk for MI, we will hold off any test also injection at this point, until totally rule out of any acute coronary syndrome       · Endocrinology consult appreciated   · Patient will establish care with endocrine as outpatient      Acute kidney injury [facility])   Assessment & Plan    · Based on my review of patient's previous BMP in [place], patient's baseline serum creatinine is around 0 84 to 1 0   · Likely prerenal   · Cr 1 32 -> 1 12 with IVF and holding HCTZ  · Resume HCTZ  · Avoid nephrotoxins and hypotension  · Monitor Cr in am      Anemia   Assessment & Plan    · Chronic  HGB 10 3 -> 9 6 gm/dL  · Monitor for excessive bruising/ bleeding with thrombocytopenia   · Monitor counts daily      Chest pain   Assessment & Plan    · Rule out acute coronary syndrome  · No aspirin as patient has severe thrombocytopenia  · Troponin levels negative x4  · Nitroglycerin + Oxygen PRN  · Hold off testosterone injection and follow-up with endocrine as outpatient      Vitamin D deficiency   Assessment & Plan    · According the patient, he was just diagnosed again with vitamin-D deficiency  · Continue vitamin D 50,000 per week  · Outpatient follow-up with primary care physician  Hypertension   Assessment & Plan    · SBP ranged from 157-173 over 24 hours  · Continue blood pressure medications, Cozaar and resume HCTZ  · Reduce IVF     Type 2 diabetes mellitus ([facility] [address] )   Assessment & Plan    No results found for: HGBA1C    Recent Labs      01/26/19   1544  01/26/19   2047  01/27/19   0735   POCGLU  131  176*  126       Blood Sugar Average: Last 72 hrs:  (P) 593 [id number]   · A1c pending   · Expect steroid induced hyperglycemia   · Endocrinology consult appreciated   · Continue Levimir 10 units QHS  · Monitor ACCU checks AC+HS, Lispro insulin sliding scale   · I expect patient's blood sugars to go up as patient will be on prednisone  · Can likely resume Metformin as outpatient   · Will follow-up with endocrine for Klinefelters syndrome, hypogonadism, and T2DM         VTE Pharmacologic Prophylaxis:   Pharmacologic: Pharmacologic VTE Prophylaxis contraindicated due to thrombocytopenia  Mechanical VTE Prophylaxis in Place: Yes    Patient Centered Rounds: I have performed bedside rounds with nursing staff today      Discussions with Specialists or Other Care Team Provider: Nursing, CM, hematology     Education and Discussions with Family / Patient: I have answered all questions to the best of my ability  Time Spent for Care: 20 minutes  More than 50% of total time spent on counseling and coordination of care as described above  Current Length of Stay: 1 day(s)    Current Patient Status: Inpatient   Certification Statement: The patient will continue to require additional inpatient hospital stay due to thrombocytopenia on IVIG and Prednisone     Discharge Plan: Patient is not medically stable until PLTs rise  Code Status: Level 1 - Full Code      Subjective:   Reports left testicular pain with ambulation  Shortness of breath with ambulation  Generalized fatigue  Usually takes Ambien or Melatonin at night and would like Melatonin today  Intermittent headaches  No bleeding  Intermittent chest tightness - worse with exertion  Objective:     Vitals:   Temp (24hrs), Av 4 °F (36 9 °C), Min:98 1 °F (36 7 °C), Max:98 7 °F (37 1 °C)    Temp:  [98 1 °F (36 7 °C)-98 7 °F (37 1 °C)] 98 2 °F (36 8 °C)  HR:  [54-67] 54  Resp:  [16-18] 18  BP: (125-174)/(58-82) 173/81  SpO2:  [95 %-99 %] 98 %  There is no height or weight on file to calculate BMI  Input and Output Summary (last 24 hours): Intake/Output Summary (Last 24 hours) at 19 1147  Last data filed at 19 1003   Gross per 24 hour   Intake              800 ml   Output             2050 ml   Net            -1250 ml       Physical Exam:     Physical Exam   Constitutional: He is oriented to person, place, and time  He appears well-developed  No distress  HENT:   Head: Normocephalic  Neck: Normal range of motion  Cardiovascular: Normal rate and regular rhythm  Pulmonary/Chest: Effort normal  No accessory muscle usage  No tachypnea  No respiratory distress  He has decreased breath sounds in the right lower field and the left lower field  He has no wheezes  He has no rhonchi  He has no rales  Abdominal: Soft   Bowel sounds are normal  He exhibits no distension  There is no tenderness  obese   Musculoskeletal: Normal range of motion  He exhibits no edema or tenderness  Neurological: He is alert and oriented to person, place, and time  Skin: Skin is warm and dry  He is not diaphoretic  Psychiatric: He has a normal mood and affect  His behavior is normal    Nursing note and vitals reviewed  Additional Data:     Labs:      Results from last 7 days  Lab Units 01/27/19  0431 01/26/19  1109   WBC Thousand/uL 3 30* 4 57   HEMOGLOBIN g/dL 9 6* 10 3*   HEMATOCRIT % 31 7* 32 7*   PLATELETS Thousands/uL 29* 4*   NEUTROS PCT %  --  69   LYMPHS PCT %  --  22   MONOS PCT %  --  7   EOS PCT %  --  1       Results from last 7 days  Lab Units 01/27/19  0431 01/26/19  1109   POTASSIUM mmol/L 4 2 3 6   CHLORIDE mmol/L 105 103   CO2 mmol/L 20* 23   BUN mg/dL 15 14   CREATININE mg/dL 1 12 1 32*   CALCIUM mg/dL 9 0 9 1   ALK PHOS U/L  --  126*   ALT U/L  --  22   AST U/L  --  21       Results from last 7 days  Lab Units 01/26/19  1109   INR  0 97       * I Have Reviewed All Lab Data Listed Above  * Additional Pertinent Lab Tests Reviewed:  All Labs Within Last 24 Hours Reviewed    Imaging:    Imaging Reports Reviewed Today Include: CT head  Imaging Personally Reviewed by Myself Includes:  None    Recent Cultures (last 7 days):           Last 24 Hours Medication List:     Current Facility-Administered Medications:  acetaminophen 650 mg Oral Q6H PRN Gerald Hummel MD    atorvastatin 40 mg Oral Daily Gerald Hummel MD    azelastine 1 spray Each Nare BID PRN Gerald Hummel MD    calcium carbonate 1,000 mg Oral Daily PRN Gerald Hummel MD    diphenhydrAMINE 25 mg Oral Q6H PRN Kamille Oliva PA-C    docusate sodium 100 mg Oral BID KATIANA Iglesias    ergocalciferol 50,000 Units Oral Weekly Gerald Hummel MD    escitalopram 10 mg Oral Daily Gerald Hummel MD    ferrous sulfate 325 mg Oral Daily Kezia Plaza MD hydrochlorothiazide 25 mg Oral Daily KATIANA Overton    immune globulin, human 60 g Intravenous Q24H Gerald Hummel MD Last Rate: 60 g (01/26/19 1937)   And        immune globulin, human 60 g Intravenous Q24H Gerald Humeml MD Last Rate: 60 g (01/26/19 1804)   insulin detemir 10 Units Subcutaneous HS Gerald Hummel MD    insulin lispro 1-5 Units Subcutaneous TID AC Gerald Hummel MD    insulin lispro 1-5 Units Subcutaneous HS Gerald Hummel MD    insulin lispro 2 Units Subcutaneous Daily With Breakfast Gerald Hummel MD    insulin lispro 2 Units Subcutaneous Daily With Lunch Gerald Hummel MD    insulin lispro 2 Units Subcutaneous Daily With Emeka Friend MD Shaheed    levothyroxine 75 mcg Oral Daily Gerald Hummel MD    loratadine 10 mg Oral Daily Gerald Hummel MD    losartan 100 mg Oral Daily Gerald Hummel MD    magnesium oxide 400 mg Oral Daily Gerald Hummel MD    melatonin 6 mg Oral HS KATIANA Overton    multivitamin-minerals 1 tablet Oral Daily Gerald Hummel MD    nitroglycerin 0 4 mg Sublingual Q5 Min PRN Gerald Hummel MD    ondansetron 4 mg Intravenous Q6H PRN Gerald Hummel MD    pantoprazole 40 mg Oral Early Morning Gerald Hummel MD    polyethylene glycol 17 g Oral Daily KATIANA Overton    predniSONE 100 mg Oral Daily Gerald Hummel MD    risperiDONE 0 5 mg Oral BID Gerald Hummel MD    senna 1 tablet Oral HS PRN Gerald Hummel MD    sodium chloride 50 mL/hr Intravenous Continuous KATIANA Overton Last Rate: 50 mL/hr (01/27/19 1003)        Today, Patient Was Seen By: KATIANA Overton    ** Please Note: Dictation voice to text software may have been used in the creation of this document   **

## 2019-01-28 PROBLEM — R06.00 EXERTIONAL DYSPNEA: Status: ACTIVE | Noted: 2019-01-28

## 2019-01-28 PROBLEM — N17.9 ACUTE KIDNEY INJURY (HCC): Status: RESOLVED | Noted: 2019-01-26 | Resolved: 2019-01-28

## 2019-01-28 LAB
ALBUMIN SERPL BCP-MCNC: 2.5 G/DL (ref 3.5–5)
ALP SERPL-CCNC: 81 U/L (ref 46–116)
ALT SERPL W P-5'-P-CCNC: 16 U/L (ref 12–78)
ANION GAP SERPL CALCULATED.3IONS-SCNC: 7 MMOL/L (ref 4–13)
AST SERPL W P-5'-P-CCNC: 14 U/L (ref 5–45)
BASOPHILS # BLD AUTO: 0.01 THOUSANDS/ΜL (ref 0–0.1)
BASOPHILS NFR BLD AUTO: 0 % (ref 0–1)
BILIRUB SERPL-MCNC: 0.3 MG/DL (ref 0.2–1)
BUN SERPL-MCNC: 20 MG/DL (ref 5–25)
CALCIUM SERPL-MCNC: 8.3 MG/DL (ref 8.3–10.1)
CHLORIDE SERPL-SCNC: 103 MMOL/L (ref 100–108)
CO2 SERPL-SCNC: 25 MMOL/L (ref 21–32)
CREAT SERPL-MCNC: 1.14 MG/DL (ref 0.6–1.3)
EOSINOPHIL # BLD AUTO: 0.01 THOUSAND/ΜL (ref 0–0.61)
EOSINOPHIL NFR BLD AUTO: 0 % (ref 0–6)
ERYTHROCYTE [DISTWIDTH] IN BLOOD BY AUTOMATED COUNT: 16.9 % (ref 11.6–15.1)
GFR SERPL CREATININE-BSD FRML MDRD: 67 ML/MIN/1.73SQ M
GLUCOSE SERPL-MCNC: 100 MG/DL (ref 65–140)
GLUCOSE SERPL-MCNC: 108 MG/DL (ref 65–140)
GLUCOSE SERPL-MCNC: 131 MG/DL (ref 65–140)
GLUCOSE SERPL-MCNC: 203 MG/DL (ref 65–140)
GLUCOSE SERPL-MCNC: 77 MG/DL (ref 65–140)
HCT VFR BLD AUTO: 27.6 % (ref 36.5–49.3)
HGB BLD-MCNC: 8.5 G/DL (ref 12–17)
IMM GRANULOCYTES # BLD AUTO: 0.01 THOUSAND/UL (ref 0–0.2)
IMM GRANULOCYTES NFR BLD AUTO: 0 % (ref 0–2)
LYMPHOCYTES # BLD AUTO: 0.86 THOUSANDS/ΜL (ref 0.6–4.47)
LYMPHOCYTES NFR BLD AUTO: 17 % (ref 14–44)
MAGNESIUM SERPL-MCNC: 2 MG/DL (ref 1.6–2.6)
MCH RBC QN AUTO: 28.4 PG (ref 26.8–34.3)
MCHC RBC AUTO-ENTMCNC: 30.8 G/DL (ref 31.4–37.4)
MCV RBC AUTO: 92 FL (ref 82–98)
MONOCYTES # BLD AUTO: 0.22 THOUSAND/ΜL (ref 0.17–1.22)
MONOCYTES NFR BLD AUTO: 4 % (ref 4–12)
NEUTROPHILS # BLD AUTO: 3.89 THOUSANDS/ΜL (ref 1.85–7.62)
NEUTS SEG NFR BLD AUTO: 79 % (ref 43–75)
NRBC BLD AUTO-RTO: 0 /100 WBCS
PHOSPHATE SERPL-MCNC: 3.1 MG/DL (ref 2.3–4.1)
PLATELET # BLD AUTO: 53 THOUSANDS/UL (ref 149–390)
PMV BLD AUTO: 11.9 FL (ref 8.9–12.7)
POTASSIUM SERPL-SCNC: 3.8 MMOL/L (ref 3.5–5.3)
PROT SERPL-MCNC: 9.7 G/DL (ref 6.4–8.2)
RBC # BLD AUTO: 2.99 MILLION/UL (ref 3.88–5.62)
SODIUM SERPL-SCNC: 135 MMOL/L (ref 136–145)
WBC # BLD AUTO: 5 THOUSAND/UL (ref 4.31–10.16)

## 2019-01-28 PROCEDURE — 93005 ELECTROCARDIOGRAM TRACING: CPT

## 2019-01-28 PROCEDURE — 99233 SBSQ HOSP IP/OBS HIGH 50: CPT | Performed by: PHYSICIAN ASSISTANT

## 2019-01-28 PROCEDURE — 99232 SBSQ HOSP IP/OBS MODERATE 35: CPT | Performed by: NURSE PRACTITIONER

## 2019-01-28 PROCEDURE — 80053 COMPREHEN METABOLIC PANEL: CPT | Performed by: NURSE PRACTITIONER

## 2019-01-28 PROCEDURE — 84100 ASSAY OF PHOSPHORUS: CPT | Performed by: NURSE PRACTITIONER

## 2019-01-28 PROCEDURE — 82948 REAGENT STRIP/BLOOD GLUCOSE: CPT

## 2019-01-28 PROCEDURE — 83735 ASSAY OF MAGNESIUM: CPT | Performed by: NURSE PRACTITIONER

## 2019-01-28 PROCEDURE — 85025 COMPLETE CBC W/AUTO DIFF WBC: CPT | Performed by: NURSE PRACTITIONER

## 2019-01-28 RX ORDER — AZELASTINE 1 MG/ML
1 SPRAY, METERED NASAL DAILY
Status: DISCONTINUED | OUTPATIENT
Start: 2019-01-28 | End: 2019-01-29 | Stop reason: HOSPADM

## 2019-01-28 RX ORDER — LORAZEPAM 0.5 MG/1
0.5 TABLET ORAL EVERY 12 HOURS PRN
Status: DISCONTINUED | OUTPATIENT
Start: 2019-01-28 | End: 2019-01-29 | Stop reason: HOSPADM

## 2019-01-28 RX ORDER — AMLODIPINE BESYLATE 2.5 MG/1
2.5 TABLET ORAL DAILY
Status: DISCONTINUED | OUTPATIENT
Start: 2019-01-28 | End: 2019-01-29 | Stop reason: HOSPADM

## 2019-01-28 RX ORDER — BISACODYL 10 MG
10 SUPPOSITORY, RECTAL RECTAL DAILY PRN
Status: DISCONTINUED | OUTPATIENT
Start: 2019-01-28 | End: 2019-01-29 | Stop reason: HOSPADM

## 2019-01-28 RX ORDER — SENNOSIDES 8.6 MG
1 TABLET ORAL 2 TIMES DAILY
Status: DISCONTINUED | OUTPATIENT
Start: 2019-01-28 | End: 2019-01-29 | Stop reason: HOSPADM

## 2019-01-28 RX ADMIN — Medication 400 MG: at 09:28

## 2019-01-28 RX ADMIN — PREDNISONE 100 MG: 20 TABLET ORAL at 09:28

## 2019-01-28 RX ADMIN — ACETAMINOPHEN 650 MG: 325 TABLET ORAL at 21:50

## 2019-01-28 RX ADMIN — ACETAMINOPHEN 650 MG: 325 TABLET ORAL at 09:37

## 2019-01-28 RX ADMIN — LEVOTHYROXINE SODIUM 75 MCG: 75 TABLET ORAL at 09:28

## 2019-01-28 RX ADMIN — ATORVASTATIN CALCIUM 40 MG: 40 TABLET, FILM COATED ORAL at 09:27

## 2019-01-28 RX ADMIN — FERROUS SULFATE TAB 325 MG (65 MG ELEMENTAL FE) 325 MG: 325 (65 FE) TAB at 09:28

## 2019-01-28 RX ADMIN — DOCUSATE SODIUM 100 MG: 100 CAPSULE, LIQUID FILLED ORAL at 09:27

## 2019-01-28 RX ADMIN — DOCUSATE SODIUM 100 MG: 100 CAPSULE, LIQUID FILLED ORAL at 17:13

## 2019-01-28 RX ADMIN — DIPHENHYDRAMINE HCL 25 MG: 25 TABLET ORAL at 05:16

## 2019-01-28 RX ADMIN — RISPERIDONE 0.5 MG: 0.25 TABLET ORAL at 09:29

## 2019-01-28 RX ADMIN — ESCITALOPRAM OXALATE 10 MG: 10 TABLET ORAL at 09:28

## 2019-01-28 RX ADMIN — POLYETHYLENE GLYCOL 3350 17 G: 17 POWDER, FOR SOLUTION ORAL at 09:27

## 2019-01-28 RX ADMIN — Medication 1 TABLET: at 09:29

## 2019-01-28 RX ADMIN — LOSARTAN POTASSIUM 100 MG: 50 TABLET, FILM COATED ORAL at 09:28

## 2019-01-28 RX ADMIN — PANTOPRAZOLE SODIUM 40 MG: 40 TABLET, DELAYED RELEASE ORAL at 05:16

## 2019-01-28 RX ADMIN — INSULIN LISPRO 1 UNITS: 100 INJECTION, SOLUTION INTRAVENOUS; SUBCUTANEOUS at 21:57

## 2019-01-28 RX ADMIN — MELATONIN 6 MG: at 21:48

## 2019-01-28 RX ADMIN — SENNOSIDES 8.6 MG: 8.6 TABLET, FILM COATED ORAL at 17:13

## 2019-01-28 RX ADMIN — RISPERIDONE 0.5 MG: 0.25 TABLET ORAL at 17:13

## 2019-01-28 RX ADMIN — HYDROCHLOROTHIAZIDE 25 MG: 25 TABLET ORAL at 09:28

## 2019-01-28 RX ADMIN — LORATADINE 10 MG: 10 TABLET ORAL at 09:28

## 2019-01-28 RX ADMIN — AMLODIPINE BESYLATE 2.5 MG: 2.5 TABLET ORAL at 17:13

## 2019-01-28 RX ADMIN — INSULIN DETEMIR 8 UNITS: 100 INJECTION, SOLUTION SUBCUTANEOUS at 21:56

## 2019-01-28 RX ADMIN — AZELASTINE HYDROCHLORIDE 1 SPRAY: 137 SPRAY, METERED NASAL at 17:13

## 2019-01-28 RX ADMIN — LORAZEPAM 0.5 MG: 0.5 TABLET ORAL at 17:32

## 2019-01-28 NOTE — ASSESSMENT & PLAN NOTE
Reports exertional dyspnea  · 2D echo unremarkable  · Patient reports poor sleep for which he frequently wakes up during the night  Overnight pulse ox was negative for hypoxia

## 2019-01-28 NOTE — ASSESSMENT & PLAN NOTE
· SBP high normal  · Continue blood pressure medications, Cozaar  · Started Norvasc 2 5 mg daily due to uncontrolled BP  Will discontinue as SBP around 110's this  Morning  · Monitor BP at home

## 2019-01-28 NOTE — ASSESSMENT & PLAN NOTE
· Due to this, patient has testosterone deficiency  · Usually takes Testosterone 200 mg/mL, 1 mL every 10-14 days for hypogonadism   · At [facility], at Care everywhere, patient's testosterone, done a few days ago, was very low  · Presently, with patient being evaluated for possibility of acute coronary syndrome, and testosterone known to increase the risk for MI, we will hold off any test also injection at this point, until totally rule out of any acute coronary syndrome       · Endocrinology consult appreciated   · Patient will establish care with endocrine as outpatient if staying local

## 2019-01-28 NOTE — PROGRESS NOTES
Progress Note - Guera Barragan 1950, 79 y o  male MRN: [id number]    Unit/Bed#: -01 Encounter: [id number]    Primary Care Provider: No primary care provider on file  Date and time admitted to hospital: 1/26/2019 10:27 AM        * Thrombocytopenia ([address] )   Assessment & Plan    · Patient has chronic ITP  Presented with platelet count of 5781  · CT head: No acute intracranial abnormality  · Had some minor bruising on his left hand, dorsal side, with minor bleeding that has stopped  · Cause unknown, no recent infection  · Given platelet transfusion that was ordered in the ER  However, per hematology, with ITP disease process - these PLTs will just likely be destroyed  Per hematology, it is not common for ITP to bleed as the new PLTs being made are still circulating     · Hematology consult appreciated    · Patient managed with IVIG x2 days and Prednisone 100 mg daily  · Presently, PLT 4 -> 29 -> 53  · Plan will be to continue a slow Prednisone taper, 10 mg every week  This will be a 10 week taper  · Monitor for increased in PLT in next 3 days  Likely stable for discharge on Wednesday  Heme would like to see his counts near 100  · Watch out for any signs and symptoms of bleeding  · F/U with [facility] if staying local, otherwise, F/U in [place]      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   Assessment & Plan    · Due to this, patient has testosterone deficiency  · Usually takes Testosterone 200 mg/mL, 1 mL every 10-14 days for hypogonadism   · At [facility], at Care everywhere, patient's testosterone, done a few days ago, was very low  · Presently, with patient being evaluated for possibility of acute coronary syndrome, and testosterone known to increase the risk for MI, we will hold off any test also injection at this point, until totally rule out of any acute coronary syndrome       · Endocrinology consult appreciated   · Patient will establish care with endocrine as outpatient if staying local      Exertional dyspnea   Assessment & Plan    Reports exertional dyspnea  · Will obtain ambulatory pulse ox  · Check an echocardiogram as patient continues to report intermittent chest pressure  · Patient reports poor sleep for which he frequently wakes up during the night, therefore, I will check an overnight pulse ox     Chest pain   Assessment & Plan    · ACS ruled out  Troponin level neg x4  EKG sinus zenia  · Nitroglycerin + Oxygen PRN  · No aspirin as patient has severe thrombocytopenia  · Hold off testosterone injection and follow-up with endocrine as outpatient   · Follow-up echocardiogram     Acute kidney injury (HCC)resolved as of 1/28/2019   Assessment & Plan    · Based on my review of patient's previous BMP in [facility], patient's baseline serum creatinine is around 0 84 to 1 0   · Likely prerenal   · Cr 1 32 -> 1 12 with IVF  · Continue HCTZ  · Avoid nephrotoxins and hypotension  · Monitor Cr in am      Vitamin D deficiency   Assessment & Plan    · According the patient, he was just diagnosed again with vitamin-D deficiency  · Continue vitamin D 50,000 per week  · Outpatient follow-up with primary care physician       Hypertension   Assessment & Plan    · SBP high normal  · Continue blood pressure medications, Cozaar and HCTZ  · Start Norvasc 2 5 mg daily and monitor     Type 2 diabetes mellitus [facility])   Assessment & Plan    Lab Results   Component Value Date    HGBA1C 5 9 01/27/2019       Recent Labs      01/27/19   1617  01/27/19   2050  01/28/19   0758  01/28/19   1125   POCGLU  240*  186*  100  77       Blood Sugar Average: Last 72 hrs:  (P) 143 875   · A1c 5 9  · Expect steroid induced hyperglycemia   · Endocrinology consult appreciated   · Continue Levimir 10 units QHS  · Monitor ACCU checks AC+HS, Lispro insulin sliding scale   · Can likely resume Metformin as outpatient   · Will follow-up with endocrine for Klinefelters syndrome, hypogonadism, and T2DM     Anemia Assessment & Plan    · Chronic  HGB 10 3 -> 9 6 -> 8 5 gm/dL  · Monitor for excessive bruising/ bleeding with thrombocytopenia   · Monitor counts daily          VTE Pharmacologic Prophylaxis:   Pharmacologic: Pharmacologic VTE Prophylaxis contraindicated due to thrombocytopenia  Mechanical VTE Prophylaxis in Place: Yes    Patient Centered Rounds: I have performed bedside rounds with nursing staff today  Discussions with Specialists or Other Care Team Provider: Nursing, CM, hematology     Education and Discussions with Family / Patient: I have answered all questions to the best of my ability  Time Spent for Care: 20 minutes  More than 50% of total time spent on counseling and coordination of care as described above  Current Length of Stay: 2 day(s)    Current Patient Status: Inpatient   Certification Statement: The patient will continue to require additional inpatient hospital stay due to thrombocytopenia on IVIG and Prednisone     Discharge Plan: Patient is not medically stable until PLTs rise > or near 100  Likely Wednesday per heme  Code Status: Level 1 - Full Code      Subjective:   Resting comfortably out of bed in chair with feet elevated  Continues to report no bowel movement in 4 days  Feels like his abdomen is gassy and distended and asking for more laxatives  Reports an episode of chest pressure this morning which is resolved  Continues to report exertional shortness of breath  Has not required supplemental oxygen  Did get a better night sleep  He would like his thyroid level checked  Objective:     Vitals:   Temp (24hrs), Av 3 °F (36 8 °C), Min:97 8 °F (36 6 °C), Max:98 7 °F (37 1 °C)    Temp:  [97 8 °F (36 6 °C)-98 7 °F (37 1 °C)] 97 8 °F (36 6 °C)  HR:  [52-66] 60  Resp:  [16-18] 18  BP: (132-165)/(66-81) 145/69  SpO2:  [94 %-97 %] 97 %  There is no height or weight on file to calculate BMI  Input and Output Summary (last 24 hours):        Intake/Output Summary (Last 24 hours) at 01/28/19 1532  Last data filed at 01/28/19 1444   Gross per 24 hour   Intake             1980 ml   Output             1900 ml   Net               80 ml       Physical Exam:     Physical Exam   Constitutional: He is oriented to person, place, and time  He appears well-developed and well-nourished  No distress  HENT:   Head: Normocephalic  Neck: Normal range of motion  Cardiovascular: Normal rate, regular rhythm and intact distal pulses  Pulmonary/Chest: Effort normal  No accessory muscle usage  No tachypnea  No respiratory distress  He has decreased breath sounds in the right lower field and the left lower field  He has no wheezes  He has no rhonchi  He has no rales  Abdominal: Soft  Bowel sounds are normal  He exhibits no distension  There is no tenderness  There is no rebound and no guarding  obese   Musculoskeletal: Normal range of motion  He exhibits edema (trace BLE)  He exhibits no tenderness  Neurological: He is alert and oriented to person, place, and time  Skin: Skin is warm and dry  He is not diaphoretic  There is pallor  Psychiatric: He has a normal mood and affect  His behavior is normal    Nursing note and vitals reviewed  Additional Data:     Labs:      Results from last 7 days  Lab Units 01/28/19  0535   WBC Thousand/uL 5 00   HEMOGLOBIN g/dL 8 5*   HEMATOCRIT % 27 6*   PLATELETS Thousands/uL 53*   NEUTROS PCT % 79*   LYMPHS PCT % 17   MONOS PCT % 4   EOS PCT % 0       Results from last 7 days  Lab Units 01/28/19  0535   POTASSIUM mmol/L 3 8   CHLORIDE mmol/L 103   CO2 mmol/L 25   BUN mg/dL 20   CREATININE mg/dL 1 14   CALCIUM mg/dL 8 3   ALK PHOS U/L 81   ALT U/L 16   AST U/L 14       Results from last 7 days  Lab Units 01/26/19  1109   INR  0 97       * I Have Reviewed All Lab Data Listed Above  * Additional Pertinent Lab Tests Reviewed:  All Labs Within Last 24 Hours Reviewed    Imaging:    Imaging Reports Reviewed Today Include: CT head  Imaging Personally Reviewed by Myself Includes:  None    Recent Cultures (last 7 days):           Last 24 Hours Medication List:     Current Facility-Administered Medications:  acetaminophen 650 mg Oral Q6H PRN Gerald Hummel MD   albuterol 2 puff Inhalation Q4H PRN KATIANA Bocanegra   amLODIPine 2 5 mg Oral Daily KATIANA Agosto   atorvastatin 40 mg Oral Daily Gerald Hummel MD   azelastine 1 spray Each Nare Daily KATIANA Bocanegra   bisacodyl 10 mg Rectal Daily PRN KATIANA Bocanegra   calcium carbonate 1,000 mg Oral Daily PRN Gerald Hummel MD   diphenhydrAMINE 25 mg Oral Q6H PRN Kamille Oliva PA-C   docusate sodium 100 mg Oral BID KATIANA Bocanegra   ergocalciferol 50,000 Units Oral Weekly Gerald Hummel MD   escitalopram 10 mg Oral Daily Gerald Hummel MD   ferrous sulfate 325 mg Oral Daily Gerald Hummel MD   hydrochlorothiazide 25 mg Oral Daily KATIANA Agosto   insulin detemir 10 Units Subcutaneous HS Gerald Hummel MD   insulin lispro 1-5 Units Subcutaneous TID AC Gerald Hummel MD   insulin lispro 1-5 Units Subcutaneous HS Gerald Hummel MD   insulin lispro 2 Units Subcutaneous Daily With Breakfast Gerald Hummel MD   insulin lispro 2 Units Subcutaneous Daily With Lunch Gerald Hummel MD   insulin lispro 2 Units Subcutaneous Daily With Thi Mike Hummel MD   levothyroxine 75 mcg Oral Daily Gerald Hummel MD   loratadine 10 mg Oral Daily Gerald Hummel MD   losartan 100 mg Oral Daily Gerald Hummel MD   magnesium oxide 400 mg Oral Daily Gerald Hummel MD   melatonin 6 mg Oral HS KATIANA Bocanegra   multivitamin-minerals 1 tablet Oral Daily Gerald Hummel MD   nitroglycerin 0 4 mg Sublingual Q5 Min PRN Gerald Hummel MD   ondansetron 4 mg Intravenous Q6H PRN Gerald Hummel MD   pantoprazole 40 mg Oral Early Morning Gerald Hummel MD   polyethylene glycol 17 g Oral Daily KATIANA Falk   predniSONE 100 mg Oral Daily Gerald Hummel MD   risperiDONE 0 5 mg Oral BID Gerald Hummel MD   senna 1 tablet Oral BID KATIANA Falk        Today, Patient Was Seen By: KATIANA Falk    ** Please Note: Dictation voice to text software may have been used in the creation of this document   **

## 2019-01-28 NOTE — ASSESSMENT & PLAN NOTE
Lab Results   Component Value Date    HGBA1C 5 9 01/27/2019       Recent Labs      01/27/19   1617  01/27/19   2050  01/28/19   0758  01/28/19   1125   POCGLU  240*  186*  100  77       Blood Sugar Average: Last 72 hrs:  (P) 143 875   · A1c 5 9  · Expect steroid induced hyperglycemia   · Endocrinology consult appreciated   · Continue Levimir 10 units QHS  · Monitor ACCU checks AC+HS, Lispro insulin sliding scale   · Can likely resume Metformin as outpatient   · Will follow-up with endocrine for Klinefelters syndrome, hypogonadism, and T2DM either locally or in [place]

## 2019-01-28 NOTE — ASSESSMENT & PLAN NOTE
· ACS ruled out  Troponin level neg x4  EKG sinus zneia  · No aspirin as patient has severe thrombocytopenia  · Hold off testosterone injection and follow-up with endocrine as outpatient   · 2D echo unremarkable

## 2019-01-28 NOTE — ASSESSMENT & PLAN NOTE
· Patient has chronic ITP  Presented with platelet count of 2596  · CT head: No acute intracranial abnormality  · Had some minor bruising on his left hand, dorsal side, with minor bleeding that has stopped  · Cause unknown, no recent infection  · Given platelet transfusion that was ordered in the ER  However, per hematology, with ITP disease process - these PLTs will just likely be destroyed  Per hematology, it is not common for ITP to bleed as the new PLTs being made are still circulating     · Hematology consult appreciated    · Patient managed with IVIG x2 days and Prednisone 100 mg daily  · Presently, PLT 4 -> 29 -> 53->74 today  · Plan will be to continue a slow Prednisone taper, 10 mg every week  This will be a 10 week taper  · Patient cleared for discharge per hematology  Will discharge patient home with Prednisone 100mg x 2 more days,then taper by 10 mg every week  · Repeat CBC in 1 week and weekly  · F/U with [facility] if staying local, otherwise, F/U in [place] with his primary hematologist in 1 week

## 2019-01-28 NOTE — PROGRESS NOTES
Progress Note -  Hematology/Oncology   Antonio Huddleston 79 y o  male MRN: [id number]  Unit/Bed#: -01 Encounter: [id number]    HPI: Antonio Huddleston is a 79y o  year old male with a history of chronic ITP  He is in [place] visiting his mother when he presented to [address] [place] [address] with chest pain  Admission CBC showed platelet of 2,809  Hemoglobin 10 3, WBC 4 57  He received 1U platelets, IVIG x 2 days (1/26,1/27) and was initiated on prednisone 100 mg daily  He responded to treatment with upward trend of platelets  Please see Dr Felipe initial consult note dated 1/27 for additional details  Assessment/Plan:   #1Acute on Chronic ITP, improving  -Admission platelets 7,577 with associated abdominal bruising and left hand bleeding   -provoking factor is unknown, patient denies infectious symptoms or medication changes    -He was initiated on corticosteroids with prednisone 100 mg daily and 2 doses of IVIG with subsequent improvement   -platelets now above 55,372, recommend continue trend platelets/CBC with continued dose of 100 mg daily prednisone  Taper by 10 mg weekly to start after discharge  He should follow up with his medical team in [place] as soon as he returns home next week  We discussed the importance of follow up and continued monitoring of CBC  I discussed the importance of immediate ED evaluation if he has spontaneous bleeding or worsening bruising after hospital discharge  I gave him my card with contact information on in the event he stays in [place] and requires hematology follow up    #2 Chronic Anemia, normocytic  -slight lower hemoglobin this admission  His baseline on outside laboratory studies from [facility] show variable rande from 10 -13 with no distinct pattern correlating with thrombocytopenia  Possibly some variability with changes in thyroid function    Recommend continue to monitor for now with recommended close follow up with his primary outpatient hematologist at [facility]     He is OK from a hematologic perspective for discharge if platelets continue to trend upward and he is otherwise medically stable by the primary team     Our recommendations were communicated to the primary team (Roberto Watkins, [address] )  Please contact us if you have any questions  Subjective: Today, he denies any new bruising, no rashes or petechia  No spontaneous bleeding  Review of Systems   Constitutional: Negative for appetite change, chills, fatigue and fever  HENT:   Negative for nosebleeds, sore throat and trouble swallowing  Eyes: Negative for icterus  Respiratory: Negative for cough and shortness of breath  Cardiovascular: Negative for chest pain and leg swelling  Gastrointestinal: Negative for abdominal pain, diarrhea, nausea and vomiting  Genitourinary: Negative for hematuria  Musculoskeletal: Negative for myalgias  Neurological: Negative for dizziness, headaches and speech difficulty  Hematological: Does not bruise/bleed easily  Psychiatric/Behavioral: Negative for confusion  All other systems reviewed and are negative  Objective:  /69 (BP Location: Left arm)   Pulse 60   Temp 97 8 °F (36 6 °C) (Oral)   Resp 18   Wt 121 kg (266 lb 8 6 oz)   SpO2 97%     Physical Exam   Constitutional: He is oriented to person, place, and time  No distress  HENT:   Mouth/Throat: Oropharynx is clear and moist    Eyes: Conjunctivae and EOM are normal  No scleral icterus  Neck: Normal range of motion  Cardiovascular: Normal rate and regular rhythm  Pulmonary/Chest: Effort normal and breath sounds normal  No respiratory distress  He has no wheezes  Abdominal: Soft  He exhibits no distension  There is no tenderness  Musculoskeletal: He exhibits no edema or tenderness  Lymphadenopathy:     He has no cervical adenopathy  Neurological: He is alert and oriented to person, place, and time  Skin: Skin is warm and dry  No rash noted  No erythema  Vitals reviewed        Recent Labs      01/26/19   1109  01/27/19   0431  01/28/19   0535   WBC  4 57  3 30*  5 00   HGB  10 3*  9 6*  8 5*   PLT  4*  29*  53*   MCV  89  91  92   RDW  16 4*  16 6*  16 9*   CREATININE  1 32*  1 12  1 14   AST  21   --   14   ALT  22   --   16       Laboratory studies were reviewed    Imaging Studies:        Pathology: None    Code Status: Level 1 - Full Code

## 2019-01-29 ENCOUNTER — APPOINTMENT (INPATIENT)
Dept: NON INVASIVE DIAGNOSTICS | Facility: HOSPITAL | Age: 69
DRG: 813 | End: 2019-01-29
Payer: COMMERCIAL

## 2019-01-29 VITALS
SYSTOLIC BLOOD PRESSURE: 115 MMHG | DIASTOLIC BLOOD PRESSURE: 72 MMHG | TEMPERATURE: 97.7 F | RESPIRATION RATE: 18 BRPM | OXYGEN SATURATION: 97 % | HEART RATE: 50 BPM | WEIGHT: 266.4 LBS

## 2019-01-29 LAB
ANION GAP SERPL CALCULATED.3IONS-SCNC: 6 MMOL/L (ref 4–13)
ATRIAL RATE: 51 BPM
BUN SERPL-MCNC: 18 MG/DL (ref 5–25)
CALCIUM SERPL-MCNC: 8.7 MG/DL (ref 8.3–10.1)
CHLORIDE SERPL-SCNC: 101 MMOL/L (ref 100–108)
CO2 SERPL-SCNC: 28 MMOL/L (ref 21–32)
CREAT SERPL-MCNC: 1.15 MG/DL (ref 0.6–1.3)
CRP SERPL QL: <3 MG/L
ERYTHROCYTE [DISTWIDTH] IN BLOOD BY AUTOMATED COUNT: 17 % (ref 11.6–15.1)
GFR SERPL CREATININE-BSD FRML MDRD: 66 ML/MIN/1.73SQ M
GLUCOSE SERPL-MCNC: 73 MG/DL (ref 65–140)
GLUCOSE SERPL-MCNC: 90 MG/DL (ref 65–140)
GLUCOSE SERPL-MCNC: 97 MG/DL (ref 65–140)
HCT VFR BLD AUTO: 28.8 % (ref 36.5–49.3)
HGB BLD-MCNC: 9.3 G/DL (ref 12–17)
MCH RBC QN AUTO: 28.6 PG (ref 26.8–34.3)
MCHC RBC AUTO-ENTMCNC: 32.3 G/DL (ref 31.4–37.4)
MCV RBC AUTO: 89 FL (ref 82–98)
P AXIS: 16 DEGREES
PLATELET # BLD AUTO: 79 THOUSANDS/UL (ref 149–390)
PMV BLD AUTO: 11.1 FL (ref 8.9–12.7)
POTASSIUM SERPL-SCNC: 3.9 MMOL/L (ref 3.5–5.3)
PR INTERVAL: 162 MS
QRS AXIS: -7 DEGREES
QRSD INTERVAL: 92 MS
QT INTERVAL: 452 MS
QTC INTERVAL: 416 MS
RBC # BLD AUTO: 3.25 MILLION/UL (ref 3.88–5.62)
SODIUM SERPL-SCNC: 135 MMOL/L (ref 136–145)
T WAVE AXIS: 4 DEGREES
TSH SERPL DL<=0.05 MIU/L-ACNC: 12.04 UIU/ML (ref 0.36–3.74)
VENTRICULAR RATE: 51 BPM
WBC # BLD AUTO: 6.1 THOUSAND/UL (ref 4.31–10.16)

## 2019-01-29 PROCEDURE — 84443 ASSAY THYROID STIM HORMONE: CPT | Performed by: NURSE PRACTITIONER

## 2019-01-29 PROCEDURE — 86140 C-REACTIVE PROTEIN: CPT | Performed by: NURSE PRACTITIONER

## 2019-01-29 PROCEDURE — 94762 N-INVAS EAR/PLS OXIMTRY CONT: CPT

## 2019-01-29 PROCEDURE — 97163 PT EVAL HIGH COMPLEX 45 MIN: CPT

## 2019-01-29 PROCEDURE — 99239 HOSP IP/OBS DSCHRG MGMT >30: CPT | Performed by: NURSE PRACTITIONER

## 2019-01-29 PROCEDURE — G8979 MOBILITY GOAL STATUS: HCPCS

## 2019-01-29 PROCEDURE — 93306 TTE W/DOPPLER COMPLETE: CPT | Performed by: INTERNAL MEDICINE

## 2019-01-29 PROCEDURE — 82948 REAGENT STRIP/BLOOD GLUCOSE: CPT

## 2019-01-29 PROCEDURE — 85027 COMPLETE CBC AUTOMATED: CPT | Performed by: NURSE PRACTITIONER

## 2019-01-29 PROCEDURE — 99232 SBSQ HOSP IP/OBS MODERATE 35: CPT | Performed by: PHYSICIAN ASSISTANT

## 2019-01-29 PROCEDURE — 93010 ELECTROCARDIOGRAM REPORT: CPT | Performed by: INTERNAL MEDICINE

## 2019-01-29 PROCEDURE — G8978 MOBILITY CURRENT STATUS: HCPCS

## 2019-01-29 PROCEDURE — 80048 BASIC METABOLIC PNL TOTAL CA: CPT | Performed by: NURSE PRACTITIONER

## 2019-01-29 PROCEDURE — 93306 TTE W/DOPPLER COMPLETE: CPT

## 2019-01-29 RX ORDER — GUAIFENESIN 600 MG
600 TABLET, EXTENDED RELEASE 12 HR ORAL EVERY 12 HOURS SCHEDULED
Qty: 14 TABLET | Refills: 0 | Status: SHIPPED | OUTPATIENT
Start: 2019-01-29 | End: 2019-02-05

## 2019-01-29 RX ORDER — LEVOTHYROXINE SODIUM 88 UG/1
88 TABLET ORAL DAILY
Qty: 30 TABLET | Refills: 0 | Status: SHIPPED | OUTPATIENT
Start: 2019-01-30

## 2019-01-29 RX ORDER — LEVOTHYROXINE SODIUM 88 UG/1
88 TABLET ORAL DAILY
Status: DISCONTINUED | OUTPATIENT
Start: 2019-01-30 | End: 2019-01-29 | Stop reason: HOSPADM

## 2019-01-29 RX ORDER — BISACODYL 10 MG
10 SUPPOSITORY, RECTAL RECTAL ONCE
Status: COMPLETED | OUTPATIENT
Start: 2019-01-29 | End: 2019-01-29

## 2019-01-29 RX ORDER — PREDNISONE 20 MG/1
TABLET ORAL
Qty: 173 TABLET | Refills: 0 | Status: SHIPPED | OUTPATIENT
Start: 2019-01-29

## 2019-01-29 RX ORDER — LEVOTHYROXINE SODIUM 88 UG/1
88 TABLET ORAL DAILY
Qty: 30 TABLET | Refills: 0 | Status: SHIPPED | OUTPATIENT
Start: 2019-01-29 | End: 2020-01-29

## 2019-01-29 RX ORDER — GUAIFENESIN 600 MG
600 TABLET, EXTENDED RELEASE 12 HR ORAL EVERY 12 HOURS SCHEDULED
Status: DISCONTINUED | OUTPATIENT
Start: 2019-01-29 | End: 2019-01-29 | Stop reason: HOSPADM

## 2019-01-29 RX ORDER — FAMOTIDINE 20 MG/1
20 TABLET, FILM COATED ORAL 2 TIMES DAILY
Qty: 180 TABLET | Refills: 0 | Status: SHIPPED | OUTPATIENT
Start: 2019-01-29

## 2019-01-29 RX ADMIN — DOCUSATE SODIUM 100 MG: 100 CAPSULE, LIQUID FILLED ORAL at 09:06

## 2019-01-29 RX ADMIN — Medication 400 MG: at 09:06

## 2019-01-29 RX ADMIN — SENNOSIDES 8.6 MG: 8.6 TABLET, FILM COATED ORAL at 09:07

## 2019-01-29 RX ADMIN — AMLODIPINE BESYLATE 2.5 MG: 2.5 TABLET ORAL at 09:07

## 2019-01-29 RX ADMIN — ESCITALOPRAM OXALATE 10 MG: 10 TABLET ORAL at 09:08

## 2019-01-29 RX ADMIN — POLYETHYLENE GLYCOL 3350 17 G: 17 POWDER, FOR SOLUTION ORAL at 09:06

## 2019-01-29 RX ADMIN — ATORVASTATIN CALCIUM 40 MG: 40 TABLET, FILM COATED ORAL at 09:06

## 2019-01-29 RX ADMIN — AZELASTINE HYDROCHLORIDE 1 SPRAY: 137 SPRAY, METERED NASAL at 09:06

## 2019-01-29 RX ADMIN — GUAIFENESIN 600 MG: 600 TABLET, EXTENDED RELEASE ORAL at 13:21

## 2019-01-29 RX ADMIN — LEVOTHYROXINE SODIUM 75 MCG: 75 TABLET ORAL at 09:06

## 2019-01-29 RX ADMIN — PREDNISONE 100 MG: 20 TABLET ORAL at 09:14

## 2019-01-29 RX ADMIN — FERROUS SULFATE TAB 325 MG (65 MG ELEMENTAL FE) 325 MG: 325 (65 FE) TAB at 09:08

## 2019-01-29 RX ADMIN — LORATADINE 10 MG: 10 TABLET ORAL at 09:08

## 2019-01-29 RX ADMIN — HYDROCHLOROTHIAZIDE 25 MG: 25 TABLET ORAL at 09:08

## 2019-01-29 RX ADMIN — PANTOPRAZOLE SODIUM 40 MG: 40 TABLET, DELAYED RELEASE ORAL at 06:49

## 2019-01-29 RX ADMIN — RISPERIDONE 0.5 MG: 0.25 TABLET ORAL at 09:08

## 2019-01-29 RX ADMIN — Medication 1 TABLET: at 09:06

## 2019-01-29 RX ADMIN — BISACODYL 10 MG: 10 SUPPOSITORY RECTAL at 13:22

## 2019-01-29 RX ADMIN — ACETAMINOPHEN 650 MG: 325 TABLET ORAL at 09:57

## 2019-01-29 RX ADMIN — LOSARTAN POTASSIUM 100 MG: 50 TABLET, FILM COATED ORAL at 09:07

## 2019-01-29 NOTE — PLAN OF CARE
CARDIOVASCULAR - ADULT     Maintains optimal cardiac output and hemodynamic stability Completed     Absence of cardiac dysrhythmias or at baseline rhythm Completed        DISCHARGE PLANNING     Discharge to home or other facility with appropriate resources Completed        INFECTION - ADULT     Absence or prevention of progression during hospitalization Completed     Absence of fever/infection during neutropenic period Completed        Knowledge Deficit     Patient/family/caregiver demonstrates understanding of disease process, treatment plan, medications, and discharge instructions Completed        METABOLIC, FLUID AND ELECTROLYTES - ADULT     Electrolytes maintained within normal limits Completed     Fluid balance maintained Completed     Glucose maintained within target range Completed        Potential for Falls     Patient will remain free of falls Completed        SAFETY ADULT     Maintain or return to baseline ADL function Completed     Maintain or return mobility status to optimal level Completed

## 2019-01-29 NOTE — PROGRESS NOTES
Reviewed TFT, TSH is elevated with low free t4  Current dose of levothyroxine is 75 microgram  Recommend to increase levothyroxine to 88 microgram daily  Blood sugars are desirable  Continue current regimen, recommend current regimen for discharge, if patient is going to be discharged on the steroids  He will need repeat TSH, free T4, in 6 weeks on outpatient basis    Paulina Samson MD

## 2019-01-29 NOTE — PHYSICAL THERAPY NOTE
PHYSICAL THERAPY NOTE    Patient Name: Reggie DE LEON Date: 1/29/2019     AGE:   79 y o  Mrn:   [id number]  ADMIT DX:  Klinefelter syndrome [Q98 4]  Chest pain [R07 9]  Thrombocytopenia (HCC) [D69 6]  Testosterone deficiency [E34 9]  History of migraine headaches [Z86 69]    Past Medical History:   Diagnosis Date    Diabetes mellitus ([facility] [address] )     Hypertension     Klinefelter syndrome with XY/XXY mosaicism      Length Of Stay: 3  PHYSICAL THERAPY EVALUATION :    01/29/19 1213   Note Type   Note type Eval only   Pain Assessment   Pain Assessment No/denies pain   Pain Score No Pain   Home Living   Type of [address] [facility] One level;Stairs to enter with rails; Work area in basement;Able to live on main level with bedroom/bathroom   [facility]   Additional Comments Pt  is staying with mother in [address] [facility] with 2 BRAULIO while in [facility] from [place]   Prior Function   Level of [place] Independent with ADLs and functional mobility   Lives With Alone  (staying with mother while in [place])   [id number] in the last 6 months 0   Vocational Retired   Comments PTA, Pt  reports Pearyadirae Marten with all ADLs, IADLs, and functional mobility without an AD   Restrictions/Precautions   Other Precautions Fall Risk   General   Additional Pertinent History Pt  is a 78 yo M who presents to ED c/o chest pains   [place] in [place], described chest pain as an elephant sitting on his chest  Dx: Thrombocytopenia, comorbidities include chest pain, Anemia, DM2, Htn, 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, Vitamin D Defficicency, exertional dyspnea   Family/Caregiver Present No   Cognition   Overall Cognitive Status WFL   Arousal/Participation Cooperative   Orientation Level Oriented X4   Memory Within functional limits   Following Commands Follows multistep commands with increased time or repetition   Comments Pt  was identified with full name and birthdate   RUE Assessment   RUE Assessment WFL   LUE Assessment   LUE Assessment WFL   RLE Assessment   RLE Assessment WFL   LLE Assessment   LLE Assessment WFL   Coordination   Movements are Fluid and Coordinated 1   Sensation WFL   Light Touch   RLE Light Touch Grossly intact   LLE Light Touch Grossly intact   Bed Mobility   Supine to Sit 6  Modified independent   Additional items Bedrails;HOB elevated   Transfers   Sit to Stand 7  Independent   Additional items Assist x 1   Stand to Sit 7  Independent   Additional items Assist x 1   Ambulation/Elevation   Gait pattern Shuffling;Decreased foot clearance;Narrow BRANT; Improper Weight shift   Gait Assistance 6  Modified independent   Additional items Assist x 1   Assistive Device None   Distance 300'x1   Balance   Static Sitting Fair   Dynamic Sitting Fair   Static Standing Fair   Dynamic Standing Fair   Ambulatory Fair   Endurance Deficit   Endurance Deficit No   Activity Tolerance   Activity Tolerance Patient tolerated treatment well   Medical Staff Made Aware Spoke to [facility]   N[facility]   Assessment   Prognosis Good   Problem List Decreased mobility; Impaired balance   Assessment Pt  is a 78 yo M who presents to ED c/o chest pains  From [place] visiting mother in [place], described chest pain as an elephant sitting on his chest  Dx: Thrombocytopenia, order placed for PT eval and tx, w/ activity order of up w/ A  pt presents w/ comorbidities of  chest pain, Anemia, DM2, Htn, 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, Vitamin D Defficicency, exertional dyspnea and personal factors of stair(s) to enter home, positive fall history and impulsivity  pt presents w/ impaired balance, gait deviations, decreased safety awareness and fall risk   these impairments are evident in findings from physical examination (impaired balance/ dizziness with change in position), mobility assessment (need for supervision/Mod I assist w/ all phases of mobility when usually mobilizing independently, tolerance to only 300 feet of ambulation and need for cueing for mobility technique), and Barthel Index: 90/100  pt needed input for task focus and mobility technique  pt is at risk for falls due to physical and safety awareness deficits  pt's clinical presentation is unstable/unpredictable (evident in need for assist w/ all phases of mobility when usually mobilizing independently, tolerance to only 300 feet of ambulation, need for input for mobility technique, need for input for task focus and mobility technique and need for input for mobility technique/safety)  pt needs inpatient PT tx to improve mobility deficits  discharge recommendation is for home w/ family support to reduce fall risk and maximize level of functional independence  D/C from PT caseload   Goals   Patient Goals to get better   Recommendation   Recommendation D/C PT   PT - OK to Discharge Yes   Additional Comments no skilled PT needs at this time   Barthel Index   Feeding 10   Bathing 5   Grooming Score 5   Dressing Score 5   Bladder Score 10   Bowels Score 10   Toilet Use Score 10   Transfers (Bed/Chair) Score 15   Mobility (Level Surface) Score 15   Stairs Score 5   Barthel Index Score 90     Skilled PT recommended while in hospital and upon DC to progress pt toward treatment goals       Magno Key, PT 1/29/2019

## 2019-01-29 NOTE — DISCHARGE INSTRUCTIONS
F/U PCP in 7-10 days  F/U Hematology in 1 week ( [place] or [place])  F/U Endo in 2 weeks( [place] or [place])  Overnight pulse ox test was good  Monitor blood sugar at home

## 2019-01-29 NOTE — PLAN OF CARE
Problem: DISCHARGE PLANNING - CARE MANAGEMENT  Goal: Discharge to post-acute care or home with appropriate resources  INTERVENTIONS:  - Conduct assessment to determine patient/family and health care team treatment goals, and need for post-acute services based on payer coverage, community resources, and patient preferences, and barriers to discharge  - Address psychosocial, clinical, and financial barriers to discharge as identified in assessment in conjunction with the patient/family and health care team  - Arrange appropriate level of post-acute services according to patients   needs and preference and payer coverage in collaboration with the physician and health care team  - Communicate with and update the patient/family, physician, and health care team regarding progress on the discharge plan  - Arrange appropriate transportation to post-acute venues  Outcome: Completed Date Met: 01/29/19  CM made aware by KATIANA Mario that Pt would like meds filled at [facility], CM sent meds to [facility] to be delivered  KATIANA Mario called in glucometer and testing scripts to [facility], Per Arveyes they are unable to fill the glucometer and testing scripts  CM made Pt aware, Pt wants the hard scripts to take to another pharmacy rather than CM faxing them to a pharmacy  Per [facility], medications total $24 50, Pt agreeable to price  Pt is from [place] visiting his mother and his mother is unable to pick him up due to weather  Pt has no other way to get home  CC Conchita GAN arranged for 4pm pickup  CM made Pt, Romana Hicks, and nurse Nohemi Huang aware  Signed waiver placed in medical records for scanning  CM reviewed IMM with Pt

## 2019-01-29 NOTE — PLAN OF CARE
Problem: PHYSICAL THERAPY ADULT  Goal: Performs mobility at highest level of function for planned discharge setting  See evaluation for individualized goals  D/C PT     See flowsheet documentation for full assessment, interventions and recommendations  Outcome: Completed Date Met: 01/29/19  Prognosis: Good  Problem List: Decreased mobility, Impaired balance  Assessment: Pt  is a 78 yo M who presents to ED c/o chest pains  [place] in [place], described chest pain as an elephant sitting on his chest  Dx: Thrombocytopenia, order placed for PT eval and tx, w/ activity order of up w/ A  pt presents w/ comorbidities of  chest pain, Anemia, DM2, Htn, 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, Vitamin D Defficicency, exertional dyspnea and personal factors of stair(s) to enter home, positive fall history and impulsivity  pt presents w/ impaired balance, gait deviations, decreased safety awareness and fall risk  these impairments are evident in findings from physical examination (impaired balance/ dizziness with change in position), mobility assessment (need for supervision/Mod I assist w/ all phases of mobility when usually mobilizing independently, tolerance to only 300 feet of ambulation and need for cueing for mobility technique), and Barthel Index: 90/100  pt needed input for task focus and mobility technique  pt is at risk for falls due to physical and safety awareness deficits  pt's clinical presentation is unstable/unpredictable (evident in need for assist w/ all phases of mobility when usually mobilizing independently, tolerance to only 300 feet of ambulation, need for input for mobility technique, need for input for task focus and mobility technique and need for input for mobility technique/safety)  pt needs inpatient PT tx to improve mobility deficits  discharge recommendation is for home w/ family support to reduce fall risk and maximize level of functional independence   D/C from PT caseload Recommendation: D/C PT     PT - OK to Discharge: Yes    See flowsheet documentation for full assessment

## 2019-01-29 NOTE — SOCIAL WORK
CM made aware by KATIANA Mario that Pt would like meds filled at [facility], CM sent meds to [facility] to be delivered  KATIANA Mario called in glucometer and testing scripts to [facility], Per Amy Agudelo they are unable to fill the glucometer and testing scripts  CM made Pt aware, Pt wants the hard scripts to take to another pharmacy rather than CM faxing them to a pharmacy  Per [facility], medications total $24 50, Pt agreeable to price  Pt is from [place] visiting his mother and his mother is unable to pick him up due to weather  Pt has no other way to get home  CC Conchita approved MALIK GAN arranged for 4pm pickup  CM made Pt, Mora Powell, and nurse Raysa Schneider aware  Signed waiver placed in medical records for scanning  CM reviewed IMM with Pt

## 2019-01-29 NOTE — PLAN OF CARE
CARDIOVASCULAR - ADULT     Maintains optimal cardiac output and hemodynamic stability Progressing     Absence of cardiac dysrhythmias or at baseline rhythm Progressing        DISCHARGE PLANNING     Discharge to home or other facility with appropriate resources Progressing        Knowledge Deficit     Patient/family/caregiver demonstrates understanding of disease process, treatment plan, medications, and discharge instructions Progressing        METABOLIC, FLUID AND ELECTROLYTES - ADULT     Electrolytes maintained within normal limits Progressing     Fluid balance maintained Progressing     Glucose maintained within target range Progressing        Potential for Falls     Patient will remain free of falls Progressing        SAFETY ADULT     Maintain or return to baseline ADL function Progressing     Maintain or return mobility status to optimal level Progressing

## 2019-01-29 NOTE — PROGRESS NOTES
Progress Note - Redaurelio No 79 y o  male MRN: [id number]    Unit/Bed#: -01 Encounter: [id number]      Assessment and Plan:  1  Acute on chronic ITP- the patient was admitted with a platelet count of 1169 of so seated with abdominal bruising  He was initiated on corticosteroids with prednisone 100 mg daily and also had two doses of IVIG  Overall the patient's platelet count has improved and is 74,000 today, it was 50,000 yesterday  He can taper the prednisone dosage by 10 mg on a weekly basis followed by weekly CBC monitoring  The patient does have a medical team in [place] which he will follow-up with once he is discharged  From a hematologic standpoint the patient is okay to be discharged as his platelets continue to improve  2  Chronic anemia, his hemoglobin was 10 3 grams/deciliter upon admission  This is partially related to his elevated TSH level  This could also be related to age  Again he does follow-up with a hematologist in [place] who can follow this once discharged  Subjective:   Overall the patient seems to be doing well  He has a fairly decent energy level  He has a great appetite  He continues to have bruising on his lower extremity and his abdomen however this continues to improve  He also notes some mild bleeding in his gums which is also getting better  He currently denies fevers, chills, chest pain, shortness of breath, nausea, vomiting, diarrhea, all other review of systems are unremarkable  PFSH was reviewed  Objective:     Vitals: Blood pressure 115/72, pulse (!) 50, temperature 97 7 °F (36 5 °C), temperature source Oral, resp  rate 18, weight 121 kg (266 lb 6 4 oz), SpO2 97 %  ,There is no height or weight on file to calculate BMI        Intake/Output Summary (Last 24 hours) at 01/29/19 1122  Last data filed at 01/29/19 1006   Gross per 24 hour   Intake             2460 ml   Output             3650 ml   Net            -1190 ml       Physical Exam: General appearance: alert, appears stated age and cooperative  Head: Normocephalic, without obvious abnormality, atraumatic  Eyes: conjunctivae/corneas clear  PERRL, EOM's intact  Fundi benign  Ears: normal TM's and external ear canals both ears  Nose: Nares normal  Septum midline  Mucosa normal  No drainage or sinus tenderness  Throat: lips, mucosa, and tongue normal; teeth and gums normal  Neck: no adenopathy, no JVD and supple, symmetrical, trachea midline  Lungs: clear to auscultation bilaterally  Chest wall: no tenderness  Heart: regular rate and rhythm, S1, S2 normal, no murmur, click, rub or gallop  Abdomen: soft, non-tender; bowel sounds normal; no masses,  no organomegaly  Obese   Extremities: extremities normal, atraumatic, no cyanosis or edema  Skin: Skin color, texture, turgor normal  No rashes or lesions  Bruising on bilateral lower extremity and on chest wall  Lymph nodes: Cervical, supraclavicular, and axillary nodes normal      Invasive Devices     Peripheral Intravenous Line            Peripheral IV 01/26/19 Right Forearm 3 days                            Lab, Imaging and other studies: I have personally reviewed pertinent reports     Admission on 01/26/2019   Component Date Value    WBC 01/26/2019 4 57     RBC 01/26/2019 3 68*    Hemoglobin 01/26/2019 10 3*    Hematocrit 01/26/2019 32 7*    MCV 01/26/2019 89     MCH 01/26/2019 28 0     MCHC 01/26/2019 31 5     RDW 01/26/2019 16 4*    Platelets 06/53/4587 4*    nRBC 01/26/2019 0     Neutrophils Relative 01/26/2019 69     Immat GRANS % 01/26/2019 0     Lymphocytes Relative 01/26/2019 22     Monocytes Relative 01/26/2019 7     Eosinophils Relative 01/26/2019 1     Basophils Relative 01/26/2019 1     Neutrophils Absolute 01/26/2019 3 13     Immature Grans Absolute 01/26/2019 0 01     Lymphocytes Absolute 01/26/2019 1 01     Monocytes Absolute 01/26/2019 0 33     Eosinophils Absolute 01/26/2019 0 05     Basophils Absolute 01/26/2019 0 04     Protime 01/26/2019 12 6     INR 01/26/2019 0 97     PTT 01/26/2019 28     Sodium 01/26/2019 138     Potassium 01/26/2019 3 6     Chloride 01/26/2019 103     CO2 01/26/2019 23     ANION GAP 01/26/2019 12     BUN 01/26/2019 14     Creatinine 01/26/2019 1 32*    Glucose 01/26/2019 120     Calcium 01/26/2019 9 1     AST 01/26/2019 21     ALT 01/26/2019 22     Alkaline Phosphatase 01/26/2019 126*    Total Protein 01/26/2019 7 1     Albumin 01/26/2019 3 1*    Total Bilirubin 01/26/2019 0 50     eGFR 01/26/2019 56     ABO Grouping 01/26/2019 AB     Rh Factor 01/26/2019 Positive     Antibody Screen 01/26/2019 Negative     Specimen Expiration Date 01/26/2019 [id number]     Troponin I 01/26/2019 <0 02     Magnesium 01/26/2019 1 9     Unit Product Code 01/27/2019 [id number]     Unit Number 01/27/2019 [id number]     Unit ABO 01/27/2019 A     Unit RH 01/27/2019 POS     Unit Dispense Status 01/27/2019 Presumed Trans     Troponin I 01/26/2019 <0 02     POC Glucose 01/26/2019 131     Troponin I 01/26/2019 <0 02     Troponin I 01/26/2019 <0 02     POC Glucose 01/26/2019 176*    WBC 01/27/2019 3 30*    RBC 01/27/2019 3 47*    Hemoglobin 01/27/2019 9 6*    Hematocrit 01/27/2019 31 7*    MCV 01/27/2019 91     MCH 01/27/2019 27 7     MCHC 01/27/2019 30 3*    RDW 01/27/2019 16 6*    Platelets 22/37/7185 29*    MPV 01/27/2019 12 0     Sodium 01/27/2019 136     Potassium 01/27/2019 4 2     Chloride 01/27/2019 105     CO2 01/27/2019 20*    ANION GAP 01/27/2019 11     BUN 01/27/2019 15     Creatinine 01/27/2019 1 12     Glucose 01/27/2019 133     Calcium 01/27/2019 9 0     eGFR 01/27/2019 68     Hemoglobin A1C 01/27/2019 5 9     EAG 01/27/2019 123     Ventricular Rate 01/26/2019 55     Atrial Rate 01/26/2019 55     DE Interval 01/26/2019 170     QRSD Interval 01/26/2019 88     QT Interval 01/26/2019 430     QTC Interval 01/26/2019 411     P Axis 01/26/2019 34     QRS Axis 01/26/2019 -3     T Wave [place] 01/26/2019 7     Ventricular Rate 01/26/2019 54     Atrial Rate 01/26/2019 54     SC Interval 01/26/2019 170     QRSD Interval 01/26/2019 88     QT Interval 01/26/2019 450     QTC Interval 01/26/2019 426     P Axis 01/26/2019 33     QRS Axis 01/26/2019 -7     T Wave Axis 01/26/2019 7     POC Glucose 01/27/2019 126     POC Glucose 01/27/2019 115     Color, UA 01/27/2019 Yellow     Clarity, UA 01/27/2019 Clear     Specific Gravity, UA 01/27/2019 <=1 005     pH, UA 01/27/2019 5 5     Leukocytes, UA 01/27/2019 Negative     Nitrite, UA 01/27/2019 Negative     Protein, UA 01/27/2019 Negative     Glucose, UA 01/27/2019 Negative     Ketones, UA 01/27/2019 Negative     Urobilinogen, UA 01/27/2019 0 2     Bilirubin, UA 01/27/2019 Negative     Blood, UA 01/27/2019 Negative     POC Glucose 01/27/2019 240*    POC Glucose 01/27/2019 186*    WBC 01/28/2019 5 00     RBC 01/28/2019 2 99*    Hemoglobin 01/28/2019 8 5*    Hematocrit 01/28/2019 27 6*    MCV 01/28/2019 92     MCH 01/28/2019 28 4     MCHC 01/28/2019 30 8*    RDW 01/28/2019 16 9*    MPV 01/28/2019 11 9     Platelets 48/36/1662 53*    nRBC 01/28/2019 0     Neutrophils Relative 01/28/2019 79*    Immat GRANS % 01/28/2019 0     Lymphocytes Relative 01/28/2019 17     Monocytes Relative 01/28/2019 4     Eosinophils Relative 01/28/2019 0     Basophils Relative 01/28/2019 0     Neutrophils Absolute 01/28/2019 3 89     Immature Grans Absolute 01/28/2019 0 01     Lymphocytes Absolute 01/28/2019 0 86     Monocytes Absolute 01/28/2019 0 22     Eosinophils Absolute 01/28/2019 0 01     Basophils Absolute 01/28/2019 0 01     Magnesium 01/28/2019 2 0     Phosphorus 01/28/2019 3 1     Sodium 01/28/2019 135*    Potassium 01/28/2019 3 8     Chloride 01/28/2019 103     CO2 01/28/2019 25     ANION GAP 01/28/2019 7     BUN 01/28/2019 20     Creatinine 01/28/2019 1 14     Glucose 01/28/2019 108     Calcium 01/28/2019 8 3     AST 01/28/2019 14     ALT 01/28/2019 16     Alkaline Phosphatase 01/28/2019 81     Total Protein 01/28/2019 9 7*    Albumin 01/28/2019 2 5*    Total Bilirubin 01/28/2019 0 30     eGFR 01/28/2019 67     POC Glucose 01/28/2019 100     POC Glucose 01/28/2019 77     POC Glucose 01/28/2019 131     POC Glucose 01/28/2019 203*    TSH 3RD GENERATON 01/29/2019 12 041*    Sodium 01/29/2019 135*    Potassium 01/29/2019 3 9     Chloride 01/29/2019 101     CO2 01/29/2019 28     ANION GAP 01/29/2019 6     BUN 01/29/2019 18     Creatinine 01/29/2019 1 15     Glucose 01/29/2019 97     Calcium 01/29/2019 8 7     eGFR 01/29/2019 66     CRP 01/29/2019 <3 0     WBC 01/29/2019 6 10     RBC 01/29/2019 3 25*    Hemoglobin 01/29/2019 9 3*    Hematocrit 01/29/2019 28 8*    MCV 01/29/2019 89     MCH 01/29/2019 28 6     MCHC 01/29/2019 32 3     RDW 01/29/2019 17 0*    Platelets 31/67/7891 79*    MPV 01/29/2019 11 1     POC Glucose 01/29/2019 90     POC Glucose 01/29/2019 73

## 2019-01-30 NOTE — DISCHARGE SUMMARY
Discharge- Arnold Gene 1950, 79 y o  male MRN: [id number]    Unit/Bed#: -01 Encounter: [id number]    Primary Care Provider: No primary care provider on file  Date and time admitted to hospital: 1/26/2019 10:27 AM        * Thrombocytopenia ([address] )   Assessment & Plan    · Patient has chronic ITP  Presented with platelet count of 7615  · CT head: No acute intracranial abnormality  · Had some minor bruising on his left hand, dorsal side, with minor bleeding that has stopped  · Cause unknown, no recent infection  · Given platelet transfusion that was ordered in the ER  However, per hematology, with ITP disease process - these PLTs will just likely be destroyed  Per hematology, it is not common for ITP to bleed as the new PLTs being made are still circulating     · Hematology consult appreciated    · Patient managed with IVIG x2 days and Prednisone 100 mg daily  · Presently, PLT 4 -> 29 -> 53->74 today  · Plan will be to continue a slow Prednisone taper, 10 mg every week  This will be a 10 week taper  · Patient cleared for discharge per hematology  Will discharge patient home with Prednisone 100mg x 2 more days,then taper by 10 mg every week  · Repeat CBC in 1 week and weekly  · [facility] with [facility] if staying local, otherwise, F/U in [place] with his primary hematologist in 1 week  Exertional dyspnea   Assessment & Plan    Reports exertional dyspnea  · 2D echo unremarkable  · Patient reports poor sleep for which he frequently wakes up during the night  Overnight pulse ox was negative for hypoxia  Vitamin D deficiency   Assessment & Plan    · According the patient, he was just diagnosed again with vitamin-D deficiency  · Continue vitamin D 50,000 per week  · Outpatient follow-up with primary care physician  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   Assessment & Plan    · Due to this, patient has testosterone deficiency    · Usually takes Testosterone 200 mg/mL, 1 mL every 10-14 days for hypogonadism   · At [facility], at [facility], patient's testosterone, done a few days ago, was very low  · Presently, with patient being evaluated for possibility of acute coronary syndrome, and testosterone known to increase the risk for MI, we will hold off any test also injection at this point, until totally rule out of any acute coronary syndrome  · Endocrinology consult appreciated   · Patient will establish care with endocrine as outpatient if staying local      Hypertension   Assessment & Plan    · SBP high normal  · Continue blood pressure medications, Cozaar  · Started Norvasc 2 5 mg daily due to uncontrolled BP  Will discontinue as SBP around 110's this  Morning  · Monitor BP at home  Type 2 diabetes mellitus [facility])   Assessment & Plan    Lab Results   Component Value Date    HGBA1C 5 9 01/27/2019       Recent Labs      01/27/19   1617  01/27/19   2050  01/28/19   0758  01/28/19   1125   POCGLU  240*  186*  100  77       Blood Sugar Average: Last 72 hrs:  (P) 143 875   · A1c 5 9  · Expect steroid induced hyperglycemia   · Endocrinology consult appreciated   · Continue Levimir 10 units QHS  · Monitor ACCU checks AC+HS, Lispro insulin sliding scale   · Can likely resume Metformin as outpatient   · Will follow-up with endocrine for Klinefelters syndrome, hypogonadism, and T2DM either locally or in [place]  Anemia   Assessment & Plan    · Chronic  HGB 10 3 -> 9 6 -> 8 5-> 9 3 gm/dL today  · Monitor for excessive bruising/ bleeding with thrombocytopenia   · Repeat CBC in 1 week  Chest pain   Assessment & Plan    · ACS ruled out  Troponin level neg x4  EKG sinus zenia  · No aspirin as patient has severe thrombocytopenia  · Hold off testosterone injection and follow-up with endocrine as outpatient   · 2D echo unremarkable        Acute kidney injury (HCC)resolved as of 1/28/2019   Assessment & Plan    · Based on my review of patient's previous BMP in [place], patient's baseline serum creatinine is around 0 84 to 1 0   · Likely prerenal   · Cr 1 32 -> 1 12 with IVF  · Continue HCTZ  · Avoid nephrotoxins and hypotension  · Monitor Cr in am        Discharging Physician / Practitioner: KATIANA Giraldo  PCP: No primary care provider on file  Admission Date: 1/26/2019  Discharge Date: 01/29/19    Reason for Admission: Chest Pain (pt reports CP, SOB, weakness for appox "24-48hours" Pt resides in [place] here visiting family  Pt recieved email from hematologist stating "my platelets are 8, she told me to get to a hospital right away" Pt reprots pain feels like "an elephant on my chest"   Pt further reprots recent passing of female figure and dog)        Resolved Problems  Date Reviewed: 1/29/2019          Resolved    Acute kidney injury ([facility] [address] ) 1/28/2019     Resolved by  Colette Bonilla, [address] [place] [address] Stay:  IP CONSULT TO ENDOCRINOLOGY  IP CONSULT TO HEMATOLOGY    Procedures Performed:     · none    Significant Findings / Test Results:     · As below    Results from last 7 days  Lab Units 01/29/19  0600 01/28/19  0535 01/27/19  0431   WBC Thousand/uL 6 10 5 00 3 30*   HEMOGLOBIN g/dL 9 3* 8 5* 9 6*   PLATELETS Thousands/uL 79* 53* 29*       Results from last 7 days  Lab Units 01/29/19  0600 01/28/19  0535 01/27/19  0431 01/26/19  1109   SODIUM mmol/L 135* 135* 136 138   POTASSIUM mmol/L 3 9 3 8 4 2 3 6   CHLORIDE mmol/L 101 103 105 103   CO2 mmol/L 28 25 20* 23   BUN mg/dL 18 20 15 14   CREATININE mg/dL 1 15 1 14 1 12 1 32*   CALCIUM mg/dL 8 7 8 3 9 0 9 1   TOTAL BILIRUBIN mg/dL  --  0 30  --  0 50   ALK PHOS U/L  --  81  --  126*   ALT U/L  --  16  --  22   AST U/L  --  14  --  21       Results from last 7 days  Lab Units 01/26/19  1109   INR  0 97       Results from last 7 days  Lab Units 01/26/19  2129 01/26/19  1829 01/26/19  1500   TROPONIN I ng/mL <0 02 <0 02 <0 02     Lab Results   Component Value Date/Time    HGBA1C 5 9 01/27/2019 04:31 AM Results from last 7 days  Lab Units 01/29/19  1043 01/29/19  0657 01/28/19  2111 01/28/19  1540 01/28/19  1125 01/28/19  0758 01/27/19  2050 01/27/19  1617 01/27/19  1046 01/27/19  0735 01/26/19  2047 01/26/19  1544   POC GLUCOSE mg/dl 73 90 203* 131 77 100 186* 240* 115 126 176* 131         Blood Culture: No results found for: BLOODCX  Urine Culture: No results found for: URINECX  Sputum Culture: No components found for: SPUTUMCX  Wound Culture: No results found for: WOUNDCULT     XR chest 2 views   Final Result by Jenny Carreon MD (01/27 1429)      No acute cardiopulmonary disease  Workstation performed: [id number]         CT head without contrast   Final Result by Ankit Kulkarni MD (01/26 1238)      No acute intracranial abnormality  Workstation performed: [id number]                Incidental Findings:   · none     Test Results Pending at Discharge (will require follow up): · None     Outpatient Tests Requested:  · CBC in one week    Complications:  none    Reason for Admission:   Chief Complaint   Patient presents with    Chest Pain     pt reports CP, SOB, weakness for appox "24-48hours" Pt resides in [place] here visiting family  Pt recieved email from hematologist stating "my platelets are 8, she told me to get to a hospital right away" Pt reprots pain feels like "an elephant on my chest"  Pt further reprots recent passing of female figure and dog       Hospital Course:     Rupal Cespedes is a 79 y o  male patient with a PMH of ITP,T2DM,HTN, Klinefelter syndrome who originally presented to the hospital on 1/26/2019 due to thrombocytopenia and chest pain  Patient received platelet transfusion on admission  Patient was seen by Hematology  Given IVIG x 2 doses and started on prednisone 100 mg po daily  Platelet count went up rapidly with above treatment  Today platelet count was 79  Patient cleared for discharge by  Hematolog   Prednisone 100 mg po daily for 3 more days(total 7 days), then taper the dosage by 10 mg on a weekly basis  Repeat CBC in 1 week  Patient will need to follow up with his primary hematology in 1 week  Patient was ruled out ACS  Troponin x 4 negative  2D echo essentially normal Patient was seen by Endo for steroid induced hyperglycemia and hypogonadism due to klinefelter's  Patient was started on Levemir 10 units HS and SSI  ACCU check acceptable  A1C was 5 9  Resumed metformin on discharge  Monitor BS TID at home  Follow up PCP in 1 week  Prescription for glucometer was provided to patient  Patient reported feeling dizzy/lightheaded this morning when got out of bed  Orthostatic negative  Patient seen by PT  No skilled PT needs  Please see above list of diagnoses and related plan for additional information  Condition at Discharge: good       Discharge Day Visit / Exam:     Subjective:  Patient reports feeling dizzy/lightheaded when got up this morning  Patient denies CP,n/v,SOB,fever,chills  Reports HA and constipation  Vitals: Blood Pressure: 115/72 (01/29/19 1025)  Pulse: (!) 50 (01/29/19 0700)  Temperature: 97 7 °F (36 5 °C) (01/29/19 0700)  Temp Source: Oral (01/29/19 0700)  Respirations: 18 (01/29/19 0700)  Weight - Scale: 121 kg (266 lb 6 4 oz) (01/29/19 0600)  SpO2: 97 % (01/29/19 0700)  Exam:   Physical Exam   Constitutional: He is oriented to person, place, and time  He appears well-developed  Patient is obese  HENT:   Head: Normocephalic and atraumatic  Neck: Normal range of motion  Neck supple  Cardiovascular: Normal rate and regular rhythm  Exam reveals no gallop and no friction rub  No murmur heard  Pulmonary/Chest: Effort normal  No respiratory distress  He has no wheezes  He has no rales  BS clear diminished BL,on RA,respiraton easy  Abdominal: Soft  Bowel sounds are normal  He exhibits no distension  There is no tenderness  There is no rebound  Musculoskeletal: Normal range of motion   He exhibits no edema, tenderness or deformity  Neurological: He is alert and oriented to person, place, and time  Skin: Skin is warm and dry  Psychiatric: He has a normal mood and affect  Nursing note and vitals reviewed  Discharge instructions/Information to patient and family:   See after visit summary for information provided to patient and family  Provisions for Follow-Up Care:  See after visit summary for information related to follow-up care and any pertinent home health orders  Disposition:     Home    Planned Readmission: none     Discharge Statement:  I spent 35 minutes discharging the patient  This time was spent on the day of discharge  I had direct contact with the patient on the day of discharge  Greater than 50% of the total time was spent examining patient, answering all patient questions, arranging and discussing plan of care with patient as well as directly providing post-discharge instructions  Additional time then spent on discharge activities  Discharge Medications:  See after visit summary for reconciled discharge medications provided to patient and family        ** Please Note: This note has been constructed using a voice recognition system **
